# Patient Record
Sex: FEMALE | Race: BLACK OR AFRICAN AMERICAN | Employment: FULL TIME | URBAN - METROPOLITAN AREA
[De-identification: names, ages, dates, MRNs, and addresses within clinical notes are randomized per-mention and may not be internally consistent; named-entity substitution may affect disease eponyms.]

---

## 2017-11-28 ENCOUNTER — HOSPITAL ENCOUNTER (EMERGENCY)
Age: 52
Discharge: HOME | End: 2017-11-28
Attending: EMERGENCY MEDICINE
Payer: SELF-PAY

## 2017-11-28 VITALS
WEIGHT: 211 LBS | OXYGEN SATURATION: 99 % | HEART RATE: 80 BPM | DIASTOLIC BLOOD PRESSURE: 99 MMHG | RESPIRATION RATE: 18 BRPM | TEMPERATURE: 98.7 F | SYSTOLIC BLOOD PRESSURE: 156 MMHG | HEIGHT: 66 IN | BODY MASS INDEX: 33.91 KG/M2

## 2017-11-28 DIAGNOSIS — N30.00 ACUTE CYSTITIS WITHOUT HEMATURIA: Primary | ICD-10-CM

## 2017-11-28 LAB
ALBUMIN SERPL-MCNC: 3.7 G/DL (ref 3.5–5)
ALBUMIN/GLOB SERPL: 0.8 {RATIO} (ref 1.2–3.5)
ALP SERPL-CCNC: 46 U/L (ref 50–136)
ALT SERPL-CCNC: 36 U/L (ref 12–65)
ANION GAP SERPL CALC-SCNC: 9 MMOL/L (ref 7–16)
AST SERPL-CCNC: 31 U/L (ref 15–37)
BACTERIA URNS QL MICRO: NORMAL /HPF
BASOPHILS # BLD: 0 K/UL (ref 0–0.2)
BASOPHILS NFR BLD: 0 % (ref 0–2)
BILIRUB SERPL-MCNC: 0.3 MG/DL (ref 0.2–1.1)
BUN SERPL-MCNC: 8 MG/DL (ref 6–23)
CALCIUM SERPL-MCNC: 8.9 MG/DL (ref 8.3–10.4)
CASTS URNS QL MICRO: NORMAL /LPF
CHLORIDE SERPL-SCNC: 104 MMOL/L (ref 98–107)
CO2 SERPL-SCNC: 27 MMOL/L (ref 21–32)
CREAT SERPL-MCNC: 0.88 MG/DL (ref 0.6–1)
DIFFERENTIAL METHOD BLD: ABNORMAL
EOSINOPHIL # BLD: 0.1 K/UL (ref 0–0.8)
EOSINOPHIL NFR BLD: 1 % (ref 0.5–7.8)
EPI CELLS #/AREA URNS HPF: NORMAL /HPF
ERYTHROCYTE [DISTWIDTH] IN BLOOD BY AUTOMATED COUNT: 15.1 % (ref 11.9–14.6)
GLOBULIN SER CALC-MCNC: 4.4 G/DL (ref 2.3–3.5)
GLUCOSE SERPL-MCNC: 103 MG/DL (ref 65–100)
HCT VFR BLD AUTO: 30.6 % (ref 35.8–46.3)
HGB BLD-MCNC: 10.3 G/DL (ref 11.7–15.4)
HGB RETIC QN AUTO: 28 PG (ref 29–35)
IMM GRANULOCYTES # BLD: 0 K/UL (ref 0–0.5)
IMM GRANULOCYTES NFR BLD AUTO: 0 % (ref 0–5)
IMM RETICS NFR: 15 % (ref 3–15.9)
LYMPHOCYTES # BLD: 2 K/UL (ref 0.5–4.6)
LYMPHOCYTES NFR BLD: 28 % (ref 13–44)
MCH RBC QN AUTO: 24.3 PG (ref 26.1–32.9)
MCHC RBC AUTO-ENTMCNC: 33.7 G/DL (ref 31.4–35)
MCV RBC AUTO: 72.3 FL (ref 79.6–97.8)
MONOCYTES # BLD: 0.8 K/UL (ref 0.1–1.3)
MONOCYTES NFR BLD: 11 % (ref 4–12)
NEUTS SEG # BLD: 4.2 K/UL (ref 1.7–8.2)
NEUTS SEG NFR BLD: 60 % (ref 43–78)
PLATELET # BLD AUTO: 222 K/UL (ref 150–450)
PMV BLD AUTO: 10.2 FL (ref 10.8–14.1)
POTASSIUM SERPL-SCNC: 3.5 MMOL/L (ref 3.5–5.1)
PROT SERPL-MCNC: 8.1 G/DL (ref 6.3–8.2)
RBC # BLD AUTO: 4.23 M/UL (ref 4.05–5.25)
RBC #/AREA URNS HPF: NORMAL /HPF
RETICS # AUTO: 0.05 M/UL (ref 0.02–0.08)
RETICS/RBC NFR AUTO: 1.1 % (ref 0.3–2)
SODIUM SERPL-SCNC: 140 MMOL/L (ref 136–145)
WBC # BLD AUTO: 7.1 K/UL (ref 4.3–11.1)
WBC URNS QL MICRO: NORMAL /HPF

## 2017-11-28 PROCEDURE — 81015 MICROSCOPIC EXAM OF URINE: CPT | Performed by: EMERGENCY MEDICINE

## 2017-11-28 PROCEDURE — 96374 THER/PROPH/DIAG INJ IV PUSH: CPT | Performed by: NURSE PRACTITIONER

## 2017-11-28 PROCEDURE — 74011250636 HC RX REV CODE- 250/636: Performed by: NURSE PRACTITIONER

## 2017-11-28 PROCEDURE — 87086 URINE CULTURE/COLONY COUNT: CPT | Performed by: NURSE PRACTITIONER

## 2017-11-28 PROCEDURE — 80053 COMPREHEN METABOLIC PANEL: CPT | Performed by: NURSE PRACTITIONER

## 2017-11-28 PROCEDURE — 81003 URINALYSIS AUTO W/O SCOPE: CPT | Performed by: NURSE PRACTITIONER

## 2017-11-28 PROCEDURE — 96361 HYDRATE IV INFUSION ADD-ON: CPT | Performed by: NURSE PRACTITIONER

## 2017-11-28 PROCEDURE — 99284 EMERGENCY DEPT VISIT MOD MDM: CPT | Performed by: NURSE PRACTITIONER

## 2017-11-28 PROCEDURE — 85025 COMPLETE CBC W/AUTO DIFF WBC: CPT | Performed by: NURSE PRACTITIONER

## 2017-11-28 PROCEDURE — 85046 RETICYTE/HGB CONCENTRATE: CPT | Performed by: NURSE PRACTITIONER

## 2017-11-28 RX ORDER — SODIUM CHLORIDE 9 MG/ML
500 INJECTION, SOLUTION INTRAVENOUS ONCE
Status: COMPLETED | OUTPATIENT
Start: 2017-11-28 | End: 2017-11-28

## 2017-11-28 RX ORDER — KETOROLAC TROMETHAMINE 30 MG/ML
30 INJECTION, SOLUTION INTRAMUSCULAR; INTRAVENOUS
Status: COMPLETED | OUTPATIENT
Start: 2017-11-28 | End: 2017-11-28

## 2017-11-28 RX ORDER — NITROFURANTOIN 25; 75 MG/1; MG/1
100 CAPSULE ORAL 2 TIMES DAILY
Qty: 14 CAP | Refills: 0 | Status: SHIPPED | OUTPATIENT
Start: 2017-11-28 | End: 2017-12-05

## 2017-11-28 RX ORDER — PHENAZOPYRIDINE HYDROCHLORIDE 200 MG/1
200 TABLET, FILM COATED ORAL 3 TIMES DAILY
Qty: 6 TAB | Refills: 0 | Status: SHIPPED | OUTPATIENT
Start: 2017-11-28 | End: 2017-11-30

## 2017-11-28 RX ADMIN — SODIUM CHLORIDE 500 ML: 900 INJECTION, SOLUTION INTRAVENOUS at 15:59

## 2017-11-28 RX ADMIN — KETOROLAC TROMETHAMINE 30 MG: 30 INJECTION, SOLUTION INTRAMUSCULAR at 16:00

## 2017-11-28 NOTE — LETTER
[facility] EMERGENCY DEPT 
[address] [place] [id number] [phone] Work/School Note Date: 11/28/2017 To Whom It May concern: 
 
Jamie Mccoy was seen and treated today in the emergency room by the following provider(s): 
Attending Provider: Pina Abraham MD 
Nurse Practitioner: COLT Steward. Jamie Mccoy may return to work on 11/30/17. Sincerely, Josey Romero RN

## 2017-11-28 NOTE — ED TRIAGE NOTES
[facility]MD[facility]. Pt c/o back pain since Saturday. Has a hx of sickle cell disease and fibromyalgia. She states that feels like this is more related to fibromyalgia than sickle cell. Per EMS pt received 400 ml of NS  And BGL was 123 in route.

## 2017-11-28 NOTE — ED PROVIDER NOTES
HPI Comments: Patient presents with lower back pain that radiates into bilateral legs and feet. She states pain started on Saturday. She states numbness \"at times\" but denies at this time. She states she is having frequent urination. She states pain is different as to when she has a flair up of sickle cell. She states pain is more consistent with her fibromyalgia pain. Patient is a 50 y.o. female presenting with back pain. The history is provided by the patient. Back Pain    This is a new problem. The current episode started more than 2 days ago. The problem has been gradually worsening. The problem occurs constantly. Patient reports not work related injury. The pain is associated with no known injury. The pain is present in the lumbar spine. The quality of the pain is described as shooting. The pain radiates to the right thigh and left thigh. The pain is at a severity of 8/10. The pain is moderate. Associated symptoms include leg pain. Pertinent negatives include no chest pain, no fever, no numbness, no weight loss, no headaches, no abdominal pain, no abdominal swelling, no bowel incontinence, no perianal numbness, no bladder incontinence, no dysuria, no pelvic pain, no paresthesias, no paresis, no tingling and no weakness. She has tried NSAIDs for the symptoms. The treatment provided no relief. Past Medical History:   Diagnosis Date    Asthma     Diabetes ([address]     Endocrine disease     Hypothyroid    Other ill-defined conditions(799.89)     Fibromyalgia       Past Surgical History:   Procedure Laterality Date    HX GYN      Tubal         History reviewed. No pertinent family history. Social History     Social History    Marital status:      Spouse name: N/A    Number of children: N/A    Years of education: N/A     Occupational History    Not on file. Social History Main Topics    Smoking status: Never Smoker    Smokeless tobacco: Never Used    Alcohol use No    Drug use:  No  Sexual activity: No     Other Topics Concern    Not on file     Social History Narrative         ALLERGIES: Latex; Kiwi; [place] flavor; and Yellow dye    Review of Systems   Constitutional: Negative for fever and weight loss. Respiratory: Negative for cough and shortness of breath. Cardiovascular: Negative for chest pain. Gastrointestinal: Negative for abdominal pain, bowel incontinence, constipation, diarrhea, nausea and vomiting. Genitourinary: Positive for frequency. Negative for bladder incontinence, dysuria and pelvic pain. Musculoskeletal: Positive for back pain and myalgias. Skin: Negative for color change and wound. Neurological: Negative for dizziness, tingling, weakness, numbness, headaches and paresthesias. Vitals:    11/28/17 1434   BP: 149/80   Pulse: 83   Resp: 16   Temp: 98.8 °F (37.1 °C)   SpO2: 99%   Weight: 95.7 kg (211 lb)   Height: 5' 6\" (1.676 m)            Physical Exam   Constitutional: She is oriented to person, place, and time. No distress. Cardiovascular: Normal rate and regular rhythm. No murmur heard. Pulmonary/Chest: Effort normal and breath sounds normal. No respiratory distress. She has no wheezes. Abdominal: Soft. There is no tenderness. Musculoskeletal:        Lumbar back: She exhibits tenderness and pain. Back:    Neurological: She is alert and oriented to person, place, and time. Skin: Skin is warm and dry. She is not diaphoretic. Psychiatric: She has a normal mood and affect. Her behavior is normal.   Nursing note and vitals reviewed.      Recent Results (from the past 12 hour(s))   CBC WITH AUTOMATED DIFF    Collection Time: 11/28/17  3:53 PM   Result Value Ref Range    WBC 7.1 4.3 - 11.1 K/uL    RBC 4.23 4.05 - 5.25 M/uL    HGB 10.3 (L) 11.7 - 15.4 g/dL    HCT 30.6 (L) 35.8 - 46.3 %    MCV 72.3 (L) 79.6 - 97.8 FL    MCH 24.3 (L) 26.1 - 32.9 PG    MCHC 33.7 31.4 - 35.0 g/dL    RDW 15.1 (H) 11.9 - 14.6 %    PLATELET 490 925 - 256 K/uL MPV 10.2 (L) 10.8 - 14.1 FL    DF AUTOMATED      NEUTROPHILS 60 43 - 78 %    LYMPHOCYTES 28 13 - 44 %    MONOCYTES 11 4.0 - 12.0 %    EOSINOPHILS 1 0.5 - 7.8 %    BASOPHILS 0 0.0 - 2.0 %    IMMATURE GRANULOCYTES 0 0.0 - 5.0 %    ABS. NEUTROPHILS 4.2 1.7 - 8.2 K/UL    ABS. LYMPHOCYTES 2.0 0.5 - 4.6 K/UL    ABS. MONOCYTES 0.8 0.1 - 1.3 K/UL    ABS. EOSINOPHILS 0.1 0.0 - 0.8 K/UL    ABS. BASOPHILS 0.0 0.0 - 0.2 K/UL    ABS. IMM. GRANS. 0.0 0.0 - 0.5 K/UL   METABOLIC PANEL, COMPREHENSIVE    Collection Time: 11/28/17  3:53 PM   Result Value Ref Range    Sodium 140 136 - 145 mmol/L    Potassium 3.5 3.5 - 5.1 mmol/L    Chloride 104 98 - 107 mmol/L    CO2 27 21 - 32 mmol/L    Anion gap 9 7 - 16 mmol/L    Glucose 103 (H) 65 - 100 mg/dL    BUN 8 6 - 23 MG/DL    Creatinine 0.88 0.6 - 1.0 MG/DL    GFR est AA >60 >60 ml/min/1.73m2    GFR est non-AA >60 >60 ml/min/1.73m2    Calcium 8.9 8.3 - 10.4 MG/DL    Bilirubin, total 0.3 0.2 - 1.1 MG/DL    ALT (SGPT) 36 12 - 65 U/L    AST (SGOT) 31 15 - 37 U/L    Alk. phosphatase 46 (L) 50 - 136 U/L    Protein, total 8.1 6.3 - 8.2 g/dL    Albumin 3.7 3.5 - 5.0 g/dL    Globulin 4.4 (H) 2.3 - 3.5 g/dL    A-G Ratio 0.8 (L) 1.2 - 3.5     RETICULOCYTE COUNT    Collection Time: 11/28/17  3:53 PM   Result Value Ref Range    Reticulocyte count 1.1 0.3 - 2.0 %    Absolute Retic Cnt. 0.0457 0.0164 - 0.0776 M/ul    Immature Retic Fraction 15.0 3.0 - 15.9 %    Retic Hgb Conc. 28 (L) 29 - 35 pg   URINE MICROSCOPIC    Collection Time: 11/28/17  3:53 PM   Result Value Ref Range    WBC 20-50 0 /hpf    RBC 0-3 0 /hpf    Epithelial cells 0-3 0 /hpf    Bacteria TRACE 0 /hpf    Casts 0-3 0 /lpf       MDM  Number of Diagnoses or Management Options  Acute cystitis without hematuria:   Diagnosis management comments: UA positive for infection. Reticulocyte count no acute findings. No other acute findings noted on lab results. Patient given IV tordaol which she states helped relieve pain.  Patient urine sent for culture and patient given prescription for Macrobid.         Amount and/or Complexity of Data Reviewed  Clinical lab tests: ordered and reviewed  Tests in the medicine section of CPT®: ordered and reviewed    Patient Progress  Patient progress: stable    ED Course       Procedures

## 2017-11-28 NOTE — DISCHARGE INSTRUCTIONS

## 2017-11-28 NOTE — ED NOTES
#20 PIV removed from left AC,  Catheter tip intact. I have reviewed discharge instructions with the patient. The patient verbalized understanding. Patient left ED via Discharge Method: ambulatory to Home with (daughter). Opportunity for questions and clarification provided. Patient given 2 scripts. Pt in no distress. Pt walked out of ER with daughter without difficulty. To continue your aftercare when you leave the hospital, you may receive an automated call from our care team to check in on how you are doing. This is a free service and part of our promise to provide the best care and service to meet your aftercare needs.  If you have questions, or wish to unsubscribe from this service please call [phone]. Thank you for Choosing our [facility] Emergency Department.

## 2017-12-01 LAB
BACTERIA SPEC CULT: NORMAL
SERVICE CMNT-IMP: NORMAL

## 2018-01-09 ENCOUNTER — HOSPITAL ENCOUNTER (EMERGENCY)
Age: 53
Discharge: HOME | End: 2018-01-09
Attending: EMERGENCY MEDICINE
Payer: SELF-PAY

## 2018-01-09 VITALS
SYSTOLIC BLOOD PRESSURE: 132 MMHG | DIASTOLIC BLOOD PRESSURE: 70 MMHG | HEIGHT: 66 IN | TEMPERATURE: 98.4 F | WEIGHT: 212 LBS | HEART RATE: 84 BPM | RESPIRATION RATE: 18 BRPM | BODY MASS INDEX: 34.07 KG/M2 | OXYGEN SATURATION: 97 %

## 2018-01-09 DIAGNOSIS — N30.00 ACUTE CYSTITIS WITHOUT HEMATURIA: Primary | ICD-10-CM

## 2018-01-09 LAB
BACTERIA URNS QL MICRO: ABNORMAL /HPF
CASTS URNS QL MICRO: ABNORMAL /LPF
EPI CELLS #/AREA URNS HPF: ABNORMAL /HPF
HCG UR QL: NEGATIVE
RBC #/AREA URNS HPF: ABNORMAL /HPF
WBC URNS QL MICRO: ABNORMAL /HPF

## 2018-01-09 PROCEDURE — 87086 URINE CULTURE/COLONY COUNT: CPT | Performed by: EMERGENCY MEDICINE

## 2018-01-09 PROCEDURE — 81015 MICROSCOPIC EXAM OF URINE: CPT | Performed by: EMERGENCY MEDICINE

## 2018-01-09 PROCEDURE — 87186 SC STD MICRODIL/AGAR DIL: CPT | Performed by: EMERGENCY MEDICINE

## 2018-01-09 PROCEDURE — 81003 URINALYSIS AUTO W/O SCOPE: CPT | Performed by: EMERGENCY MEDICINE

## 2018-01-09 PROCEDURE — 99284 EMERGENCY DEPT VISIT MOD MDM: CPT | Performed by: EMERGENCY MEDICINE

## 2018-01-09 PROCEDURE — 87088 URINE BACTERIA CULTURE: CPT | Performed by: EMERGENCY MEDICINE

## 2018-01-09 PROCEDURE — 81025 URINE PREGNANCY TEST: CPT

## 2018-01-09 RX ORDER — CEPHALEXIN 500 MG/1
500 CAPSULE ORAL 4 TIMES DAILY
Qty: 28 CAP | Refills: 0 | Status: SHIPPED | OUTPATIENT
Start: 2018-01-09 | End: 2018-01-16

## 2018-01-09 RX ORDER — GLIPIZIDE 10 MG/1
10 TABLET, FILM COATED, EXTENDED RELEASE ORAL DAILY
COMMUNITY
End: 2021-03-18 | Stop reason: CLARIF

## 2018-01-10 NOTE — DISCHARGE INSTRUCTIONS

## 2018-01-10 NOTE — ED NOTES
I have reviewed discharge instructions with the patient. The patient verbalized understanding. Patient left ED via Discharge Method: ambulatory to Home with (self). Opportunity for questions and clarification provided. Patient given 1 scripts. To continue your aftercare when you leave the hospital, you may receive an automated call from our care team to check in on how you are doing. This is a free service and part of our promise to provide the best care and service to meet your aftercare needs.  If you have questions, or wish to unsubscribe from this service please call [phone]. Thank you for Choosing our [facility] Emergency Department.

## 2018-01-10 NOTE — ED TRIAGE NOTES
States that she was recently treated for a UTI.   States that she has noticed her urine being cloudy and foul smelling urine

## 2018-01-10 NOTE — ED PROVIDER NOTES
HPI Comments: Pt with UTI 2 weeks ago. Improved but then returned 2 days ago. Odor and pain present. Patient is a 50 y.o. female presenting with urinary tract infection. The history is provided by the patient. No  was used. Bladder Infection    This is a new problem. The current episode started 2 days ago. The problem occurs every urination. The problem has not changed since onset. The quality of the pain is described as burning. The pain is mild. There has been no fever. Associated symptoms include frequency. Pertinent negatives include no chills, no sweats, no nausea, no vomiting, no discharge, no hematuria, no urgency and no flank pain. She has tried nothing for the symptoms. Past Medical History:   Diagnosis Date    Asthma     Diabetes [address] [address]     Endocrine disease     Hypothyroid    Other ill-defined conditions(799.89)     Fibromyalgia       Past Surgical History:   Procedure Laterality Date    HX GYN      Tubal         History reviewed. No pertinent family history. Social History     Social History    Marital status:      Spouse name: N/A    Number of children: N/A    Years of education: N/A     Occupational History    Not on file. Social History Main Topics    Smoking status: Never Smoker    Smokeless tobacco: Never Used    Alcohol use No    Drug use: No    Sexual activity: No     Other Topics Concern    Not on file     Social History Narrative         ALLERGIES: Latex; Kiwi; [place] flavor; and Yellow dye    Review of Systems   Constitutional: Negative for chills and fever. Eyes: Negative for pain and redness. Respiratory: Negative for chest tightness, shortness of breath and wheezing. Cardiovascular: Negative for chest pain and leg swelling. Gastrointestinal: Negative for abdominal pain, diarrhea, nausea and vomiting. Genitourinary: Positive for dysuria and frequency. Negative for flank pain, hematuria and urgency.    Musculoskeletal: Negative for back pain, gait problem, neck pain and neck stiffness. Skin: Negative for color change and rash. Neurological: Negative for weakness, numbness and headaches. Vitals:    01/09/18 1922   BP: 141/79   Pulse: 87   Resp: 20   Temp: 98.5 °F (36.9 °C)   SpO2: 96%   Weight: 96.2 kg (212 lb)   Height: 5' 6\" (1.676 m)            Physical Exam   Constitutional: She is oriented to person, place, and time. She appears well-developed and well-nourished. HENT:   Head: Normocephalic and atraumatic. Cardiovascular: Normal rate and regular rhythm. No murmur heard. Pulmonary/Chest: Effort normal and breath sounds normal. She has no wheezes. Abdominal: Soft. Bowel sounds are normal. There is no tenderness. Neurological: She is alert and oriented to person, place, and time. Skin: Skin is warm and dry. Nursing note and vitals reviewed. MDM  Number of Diagnoses or Management Options  Diagnosis management comments: uti will treat.         Amount and/or Complexity of Data Reviewed  Clinical lab tests: ordered and reviewed    Patient Progress  Patient progress: stable    ED Course       Procedures           Results Include:    Recent Results (from the past 24 hour(s))   HCG URINE, QL. - POC    Collection Time: 01/09/18  8:04 PM   Result Value Ref Range    Pregnancy test,urine (POC) NEGATIVE  NEG     URINE MICROSCOPIC    Collection Time: 01/09/18  8:08 PM   Result Value Ref Range    WBC  0 /hpf    RBC 0-3 0 /hpf    Epithelial cells 0-3 0 /hpf    Bacteria 4+ (H) 0 /hpf    Casts 3-5 0 /lpf

## 2018-01-13 LAB
BACTERIA SPEC CULT: ABNORMAL
SERVICE CMNT-IMP: ABNORMAL

## 2018-10-13 ENCOUNTER — HOSPITAL ENCOUNTER (EMERGENCY)
Age: 53
Discharge: HOME | End: 2018-10-14
Attending: EMERGENCY MEDICINE
Payer: SELF-PAY

## 2018-10-13 DIAGNOSIS — E11.65 TYPE 2 DIABETES MELLITUS WITH HYPERGLYCEMIA, WITHOUT LONG-TERM CURRENT USE OF INSULIN (HCC): ICD-10-CM

## 2018-10-13 DIAGNOSIS — G43.801 OTHER MIGRAINE WITH STATUS MIGRAINOSUS, NOT INTRACTABLE: Primary | ICD-10-CM

## 2018-10-13 LAB
ALBUMIN SERPL-MCNC: 3.4 G/DL (ref 3.5–5)
ALBUMIN/GLOB SERPL: 0.7 {RATIO}
ALP SERPL-CCNC: 68 U/L (ref 50–130)
ALT SERPL-CCNC: 28 U/L (ref 12–65)
ANION GAP SERPL CALC-SCNC: 8 MMOL/L
AST SERPL-CCNC: 12 U/L (ref 15–37)
BACTERIA URNS QL MICRO: 0 /HPF
BASOPHILS # BLD: 0 K/UL (ref 0–0.2)
BASOPHILS NFR BLD: 0 % (ref 0–2)
BILIRUB SERPL-MCNC: 0.2 MG/DL (ref 0.2–1.1)
BUN SERPL-MCNC: 9 MG/DL (ref 6–23)
CALCIUM SERPL-MCNC: 8.7 MG/DL (ref 8.3–10.4)
CASTS URNS QL MICRO: 0 /LPF
CHLORIDE SERPL-SCNC: 101 MMOL/L (ref 98–107)
CO2 SERPL-SCNC: 28 MMOL/L (ref 21–32)
CREAT SERPL-MCNC: 1.06 MG/DL (ref 0.6–1)
DIFFERENTIAL METHOD BLD: ABNORMAL
EOSINOPHIL # BLD: 0.2 K/UL (ref 0–0.8)
EOSINOPHIL NFR BLD: 2 % (ref 0.5–7.8)
EPI CELLS #/AREA URNS HPF: NORMAL /HPF
ERYTHROCYTE [DISTWIDTH] IN BLOOD BY AUTOMATED COUNT: 13.8 %
GLOBULIN SER CALC-MCNC: 4.6 G/DL (ref 2.3–3.5)
GLUCOSE BLD STRIP.AUTO-MCNC: 225 MG/DL (ref 65–100)
GLUCOSE BLD STRIP.AUTO-MCNC: 359 MG/DL (ref 65–100)
GLUCOSE SERPL-MCNC: 233 MG/DL (ref 65–100)
HCG UR QL: NEGATIVE
HCT VFR BLD AUTO: 32.2 % (ref 35.8–46.3)
HGB BLD-MCNC: 10.2 G/DL (ref 11.7–15.4)
IMM GRANULOCYTES # BLD: 0 K/UL (ref 0–0.5)
IMM GRANULOCYTES NFR BLD AUTO: 0 % (ref 0–5)
LYMPHOCYTES # BLD: 2.4 K/UL (ref 0.5–4.6)
LYMPHOCYTES NFR BLD: 37 % (ref 13–44)
MCH RBC QN AUTO: 23.3 PG (ref 26.1–32.9)
MCHC RBC AUTO-ENTMCNC: 31.7 G/DL (ref 31.4–35)
MCV RBC AUTO: 73.7 FL (ref 79.6–97.8)
MONOCYTES # BLD: 0.7 K/UL (ref 0.1–1.3)
MONOCYTES NFR BLD: 10 % (ref 4–12)
NEUTS SEG # BLD: 3.2 K/UL (ref 1.7–8.2)
NEUTS SEG NFR BLD: 50 % (ref 43–78)
NRBC # BLD: 0 K/UL (ref 0–0.2)
PLATELET # BLD AUTO: 306 K/UL (ref 150–450)
PMV BLD AUTO: 10.3 FL (ref 9.4–12.3)
POTASSIUM SERPL-SCNC: 3.2 MMOL/L (ref 3.5–5.1)
PROT SERPL-MCNC: 8 G/DL
RBC # BLD AUTO: 4.37 M/UL (ref 4.05–5.2)
RBC #/AREA URNS HPF: NORMAL /HPF
SODIUM SERPL-SCNC: 137 MMOL/L (ref 136–145)
WBC # BLD AUTO: 6.4 K/UL (ref 4.3–11.1)
WBC URNS QL MICRO: NORMAL /HPF

## 2018-10-13 PROCEDURE — 96361 HYDRATE IV INFUSION ADD-ON: CPT | Performed by: EMERGENCY MEDICINE

## 2018-10-13 PROCEDURE — 99284 EMERGENCY DEPT VISIT MOD MDM: CPT | Performed by: EMERGENCY MEDICINE

## 2018-10-13 PROCEDURE — 74011250636 HC RX REV CODE- 250/636: Performed by: EMERGENCY MEDICINE

## 2018-10-13 PROCEDURE — 81025 URINE PREGNANCY TEST: CPT

## 2018-10-13 PROCEDURE — 81003 URINALYSIS AUTO W/O SCOPE: CPT | Performed by: EMERGENCY MEDICINE

## 2018-10-13 PROCEDURE — 85025 COMPLETE CBC W/AUTO DIFF WBC: CPT

## 2018-10-13 PROCEDURE — 74011636637 HC RX REV CODE- 636/637: Performed by: EMERGENCY MEDICINE

## 2018-10-13 PROCEDURE — 82962 GLUCOSE BLOOD TEST: CPT

## 2018-10-13 PROCEDURE — 81015 MICROSCOPIC EXAM OF URINE: CPT

## 2018-10-13 PROCEDURE — 96375 TX/PRO/DX INJ NEW DRUG ADDON: CPT | Performed by: EMERGENCY MEDICINE

## 2018-10-13 PROCEDURE — 96374 THER/PROPH/DIAG INJ IV PUSH: CPT | Performed by: EMERGENCY MEDICINE

## 2018-10-13 PROCEDURE — 80053 COMPREHEN METABOLIC PANEL: CPT

## 2018-10-13 RX ORDER — METOCLOPRAMIDE HYDROCHLORIDE 5 MG/ML
10 INJECTION INTRAMUSCULAR; INTRAVENOUS
Status: COMPLETED | OUTPATIENT
Start: 2018-10-13 | End: 2018-10-13

## 2018-10-13 RX ORDER — KETOROLAC TROMETHAMINE 30 MG/ML
30 INJECTION, SOLUTION INTRAMUSCULAR; INTRAVENOUS
Status: COMPLETED | OUTPATIENT
Start: 2018-10-13 | End: 2018-10-13

## 2018-10-13 RX ADMIN — INSULIN HUMAN 6 UNITS: 100 INJECTION, SOLUTION PARENTERAL at 22:34

## 2018-10-13 RX ADMIN — SODIUM CHLORIDE 1000 ML: 900 INJECTION, SOLUTION INTRAVENOUS at 22:03

## 2018-10-13 RX ADMIN — METOCLOPRAMIDE 10 MG: 5 INJECTION, SOLUTION INTRAMUSCULAR; INTRAVENOUS at 22:34

## 2018-10-13 RX ADMIN — KETOROLAC TROMETHAMINE 30 MG: 30 INJECTION, SOLUTION INTRAMUSCULAR at 22:34

## 2018-10-14 VITALS
HEART RATE: 84 BPM | BODY MASS INDEX: 35.36 KG/M2 | RESPIRATION RATE: 18 BRPM | TEMPERATURE: 98 F | WEIGHT: 220 LBS | DIASTOLIC BLOOD PRESSURE: 77 MMHG | HEIGHT: 66 IN | SYSTOLIC BLOOD PRESSURE: 160 MMHG | OXYGEN SATURATION: 99 %

## 2018-10-14 NOTE — ED PROVIDER NOTES
HPI Comments: Patient complains of throbbing left-sided headache since yesterday. She has also been playing more frequently and blood sugar has been elevated. She has a history of type 2 diabetes. Is not been able to tolerate her metformin and only takes half of a dose. She is also on glipizide. Patient had some sliders from a fast food restaurant and sweet tea today. She denies any runny nose congestion cough or fever. No neck pain or neck stiffness. No numbness tingling or weakness on the left or right side. Previous history of migraine headaches when she was younger which were much more severe but similar. Patient is a 48 y.o. female presenting with headaches. The history is provided by the patient. Headache This is a new problem. The current episode started yesterday. The problem occurs constantly. The problem has not changed since onset. The headache is aggravated by bright light and nausea. The pain is located in the left unilateral region. The quality of the pain is described as throbbing. Associated symptoms include nausea. Pertinent negatives include no fever, no syncope, no shortness of breath, no visual change and no vomiting. She has tried nothing for the symptoms. Past Medical History:  
Diagnosis Date  Asthma  Diabetes ([address]  Endocrine disease Hypothyroid  Other ill-defined conditions(799.89) Fibromyalgia Past Surgical History:  
Procedure Laterality Date  HX GYN Tubal  
 
   
History reviewed. No pertinent family history. Social History Social History  Marital status:  Spouse name: N/A  
 Number of children: N/A  
 Years of education: N/A Occupational History  Not on file. Social History Main Topics  Smoking status: Never Smoker  Smokeless tobacco: Never Used  Alcohol use No  
 Drug use: No  
 Sexual activity: No  
 
Other Topics Concern  Not on file Social History Narrative ALLERGIES: Latex; Kiwi; [place] flavor; and Yellow dye Review of Systems Constitutional: Negative for fever. HENT: Negative for congestion and rhinorrhea. Respiratory: Negative for shortness of breath. Cardiovascular: Negative for syncope. Gastrointestinal: Positive for nausea. Negative for abdominal pain, diarrhea and vomiting. Endocrine: Positive for polydipsia and polyuria. Genitourinary: Positive for frequency. Negative for dysuria. Musculoskeletal: Negative for back pain and neck pain. Neurological: Positive for headaches. Vitals:  
 10/13/18 2147 10/13/18 2151 BP: 148/86 Pulse: 84 Resp: 18 Temp: 98 °F (36.7 °C) SpO2: 98% Weight:  99.8 kg (220 lb) Height:  5' 6\" (1.676 m) Physical Exam  
Constitutional: She is oriented to person, place, and time. She appears well-developed and well-nourished. Eyes: Conjunctivae are normal. Pupils are equal, round, and reactive to light. Neck: Normal range of motion. Neck supple. Cardiovascular: Normal rate, regular rhythm and normal heart sounds. No murmur heard. Pulmonary/Chest: Breath sounds normal. No respiratory distress. Abdominal: Soft. She exhibits no distension. There is no tenderness. There is no rebound and no guarding. Musculoskeletal: Normal range of motion. She exhibits no edema or tenderness. Neurological: She is alert and oriented to person, place, and time. She has normal strength. No cranial nerve deficit or sensory deficit. She exhibits normal muscle tone. Coordination normal. GCS eye subscore is 4. GCS verbal subscore is 5. GCS motor subscore is 6. Skin: Skin is warm and dry. MDM Number of Diagnoses or Management Options Diagnosis management comments: Blood sugar elevated. Medicine and dietary noncompliance. Headache is not thunderclap or worst of her life. Headache consistent with migraine type headache. Treat blood sugar. Check for UTI. Medicate for migraine headache. Amount and/or Complexity of Data Reviewed Clinical lab tests: ordered and reviewed (Results for orders placed or performed during the hospital encounter of 10/13/18 
-CBC WITH AUTOMATED DIFF Result                                            Value                         Ref Range WBC                                               6.4                           4.3 - 11.1 K/uL           
     RBC                                               4.37                          4.05 - 5.2 M/uL HGB                                               10.2 (L)                      11.7 - 15.4 g/dL HCT                                               32.2 (L)                      35.8 - 46.3 % MCV                                               73.7 (L)                      79.6 - 97.8 FL            
     MCH                                               23.3 (L)                      26.1 - 32.9 PG            
     MCHC                                              31.7                          31.4 - 35.0 g/dL RDW                                               13.8                          % PLATELET                                          306                           150 - 450 K/uL MPV                                               10.3                          9.4 - 12.3 FL ABSOLUTE NRBC                                     0.00                          0.0 - 0.2 K/uL            
     DF                                                AUTOMATED NEUTROPHILS                                       50                            43 - 78 % LYMPHOCYTES                                       37                            13 - 44 % MONOCYTES                                         10                            4.0 - 12.0 % EOSINOPHILS                                       2                             0.5 - 7.8 % BASOPHILS                                         0                             0.0 - 2.0 % IMMATURE GRANULOCYTES                             0                             0.0 - 5.0 %               
     ABS. NEUTROPHILS                                  3.2                           1.7 - 8.2 K/UL            
     ABS. LYMPHOCYTES                                  2.4                           0.5 - 4.6 K/UL            
     ABS. MONOCYTES                                    0.7                           0.1 - 1.3 K/UL            
     ABS. EOSINOPHILS                                  0.2                           0.0 - 0.8 K/UL            
     ABS. BASOPHILS                                    0.0                           0.0 - 0.2 K/UL            
     ABS. IMM. GRANS.                                  0.0                           0.0 - 0.5 K/UL            
-METABOLIC PANEL, COMPREHENSIVE Result                                            Value                         Ref Range Sodium                                            137                           136 - 145 mmol/L Potassium                                         3.2 (L)                       3.5 - 5.1 mmol/L Chloride                                          101                           98 - 107 mmol/L           
     CO2                                               28                            21 - 32 mmol/L Anion gap                                         8                             mmol/L Glucose                                           233 (H)                       65 - 100 mg/dL BUN                                               9                             6 - 23 MG/DL Creatinine                                        1.06 (H)                      0.6 - 1.0 MG/DL           
     GFR est AA                                        >60                           >60 ml/min/1.73m2 GFR est non-AA                                    58                            ml/min/1.73m2 Calcium                                           8.7                           8.3 - 10.4 MG/DL Bilirubin, total                                  0.2                           0.2 - 1.1 MG/DL           
     ALT (SGPT)                                        28                            12 - 65 U/L               
     AST (SGOT)                                        12 (L)                        15 - 37 U/L Alk. phosphatase                                  68                            50 - 130 U/L Protein, total                                    8.0                           g/dL Albumin                                           3.4 (L)                       3.5 - 5.0 g/dL Globulin                                          4.6 (H)                       2.3 - 3.5 g/dL A-G Ratio                                         0.7 -URINE MICROSCOPIC Result                                            Value                         Ref Range WBC                                               5-10                          0 /hpf                    
     RBC                                               0-3                           0 /hpf      Epithelial cells                                  0-3                           0 /hpf                    
 Bacteria                                          0                             0 /hpf Casts                                             0                             0 /lpf -GLUCOSE, POC Result                                            Value                         Ref Range Glucose (POC)                                     359 (H)                       65 - 100 mg/dL            
-HCG URINE, QL. - POC Result                                            Value                         Ref Range Pregnancy test,urine (POC)                        NEGATIVE                      NEG                       
-GLUCOSE, POC Result                                            Value                         Ref Range Glucose (POC)                                     225 (H)                       65 - 100 mg/dL            
) 
 
 
 
 
ED Course Procedures

## 2018-10-14 NOTE — ED NOTES
I have reviewed discharge instructions with the patient. The patient verbalized understanding. Patient left ED via Discharge Method: ambulatory to Home with (insert name of family/friend, self, transport POV). Opportunity for questions and clarification provided. Patient given 0 scripts. To continue your aftercare when you leave the hospital, you may receive an automated call from our care team to check in on how you are doing. This is a free service and part of our promise to provide the best care and service to meet your aftercare needs.  If you have questions, or wish to unsubscribe from this service please call [phone]. Thank you for Choosing our [facility] Emergency Department.

## 2018-10-14 NOTE — DISCHARGE INSTRUCTIONS
Migraine Headache: Care Instructions  Your Care Instructions  Migraines are painful, throbbing headaches that often start on one side of the head. They may cause nausea and vomiting and make you sensitive to light, sound, or smell. Without treatment, migraines can last from 4 hours to a few days. Medicines can help prevent migraines or stop them after they have started. Your doctor can help you find which ones work best for you. Follow-up care is a key part of your treatment and safety. Be sure to make and go to all appointments, and call your doctor if you are having problems. It's also a good idea to know your test results and keep a list of the medicines you take. How can you care for yourself at home? · Do not drive if you have taken a prescription pain medicine. · Rest in a quiet, dark room until your headache is gone. Close your eyes, and try to relax or go to sleep. Don't watch TV or read. · Put a cold, moist cloth or cold pack on the painful area for 10 to 20 minutes at a time. Put a thin cloth between the cold pack and your skin. · Use a warm, moist towel or a heating pad set on low to relax tight shoulder and neck muscles. · Have someone gently massage your neck and shoulders. · Take your medicines exactly as prescribed. Call your doctor if you think you are having a problem with your medicine. You will get more details on the specific medicines your doctor prescribes. · Be careful not to take pain medicine more often than the instructions allow. You could get worse or more frequent headaches when the medicine wears off. To prevent migraines  · Keep a headache diary so you can figure out what triggers your headaches. Avoiding triggers may help you prevent headaches. Record when each headache began, how long it lasted, and what the pain was like.  (Was it throbbing, aching, stabbing, or dull?) Write down any other symptoms you had with the headache, such as nausea, flashing lights or dark spots, or sensitivity to bright light or loud noise. Note if the headache occurred near your period. List anything that might have triggered the headache. Triggers may include certain foods (chocolate, cheese, wine) or odors, smoke, bright light, stress, or lack of sleep. · If your doctor has prescribed medicine for your migraines, take it as directed. You may have medicine that you take only when you get a migraine and medicine that you take all the time to help prevent migraines. ¨ If your doctor has prescribed medicine for when you get a headache, take it at the first sign of a migraine, unless your doctor has given you other instructions. ¨ If your doctor has prescribed medicine to prevent migraines, take it exactly as prescribed. Call your doctor if you think you are having a problem with your medicine. · Find healthy ways to deal with stress. Migraines are most common during or right after stressful times. Take time to relax before and after you do something that has caused a migraine in the past.  · Try to keep your muscles relaxed by keeping good posture. Check your jaw, face, neck, and shoulder muscles for tension. Try to relax them. When you sit at a desk, change positions often. And make sure to stretch for 30 seconds each hour. · Get plenty of sleep and exercise. · Eat meals on a regular schedule. Avoid foods and drinks that often trigger migraines. These include chocolate, alcohol (especially red wine and port), aspartame, monosodium glutamate (MSG), and some additives found in foods (such as hot dogs, ring, cold cuts, aged cheeses, and pickled foods). · Limit caffeine. Don't drink too much coffee, tea, or soda. But don't quit caffeine suddenly. That can also give you migraines. · Do not smoke or allow others to smoke around you. If you need help quitting, talk to your doctor about stop-smoking programs and medicines. These can increase your chances of quitting for good.   · If you are taking birth control pills or hormone therapy, talk to your doctor about whether they are triggering your migraines. When should you call for help? Call 911 anytime you think you may need emergency care. For example, call if:    · You have signs of a stroke. These may include:  ¨ Sudden numbness, paralysis, or weakness in your face, arm, or leg, especially on only one side of your body. ¨ Sudden vision changes. ¨ Sudden trouble speaking. ¨ Sudden confusion or trouble understanding simple statements. ¨ Sudden problems with walking or balance. ¨ A sudden, severe headache that is different from past headaches.    Call your doctor now or seek immediate medical care if:    · You have new or worse nausea and vomiting.     · You have a new or higher fever.     · Your headache gets much worse.    Watch closely for changes in your health, and be sure to contact your doctor if:    · You are not getting better after 2 days (48 hours). Where can you learn more? Go to http://rita-luis armando.info/. Enter V311 in the search box to learn more about \"Migraine Headache: Care Instructions. \"  Current as of: June 4, 2018  Content Version: 11.8  © 3267-4779 [employer]. Care instructions adapted under license by [employer] (which disclaims liability or warranty for this information). If you have questions about a medical condition or this instruction, always ask your healthcare professional. [address] any warranty or liability for your use of this information.

## 2019-08-03 ENCOUNTER — APPOINTMENT (OUTPATIENT)
Dept: GENERAL RADIOLOGY | Age: 54
End: 2019-08-03
Attending: PHYSICIAN ASSISTANT
Payer: SELF-PAY

## 2019-08-03 ENCOUNTER — HOSPITAL ENCOUNTER (EMERGENCY)
Age: 54
Discharge: HOME | End: 2019-08-03
Attending: EMERGENCY MEDICINE
Payer: SELF-PAY

## 2019-08-03 VITALS
TEMPERATURE: 98.8 F | DIASTOLIC BLOOD PRESSURE: 89 MMHG | HEIGHT: 66 IN | SYSTOLIC BLOOD PRESSURE: 150 MMHG | OXYGEN SATURATION: 100 % | HEART RATE: 90 BPM | BODY MASS INDEX: 32.14 KG/M2 | WEIGHT: 200 LBS | RESPIRATION RATE: 16 BRPM
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
## 2019-08-03 PROCEDURE — 99283 EMERGENCY DEPT VISIT LOW MDM: CPT | Performed by: PHYSICIAN ASSISTANT

## 2019-08-03 PROCEDURE — 72040 X-RAY EXAM NECK SPINE 2-3 VW: CPT

## 2019-08-03 PROCEDURE — 73030 X-RAY EXAM OF SHOULDER: CPT

## 2019-08-03 RX ORDER — METHOCARBAMOL 750 MG/1
750 TABLET, FILM COATED ORAL 3 TIMES DAILY
Qty: 30 TAB | Refills: 0 | Status: SHIPPED | OUTPATIENT
Start: 2019-08-03 | End: 2019-08-13

## 2019-08-03 RX ORDER — NAPROXEN 500 MG/1
500 TABLET ORAL 2 TIMES DAILY WITH MEALS
Qty: 20 TAB | Refills: 0 | Status: SHIPPED | OUTPATIENT
Start: 2019-08-03 | End: 2019-08-13

## 2019-08-03 NOTE — LETTER
[address] [address] EMERGENCY DEPT 
[address] [facility] 
[place] [id number] [phone] Work/School Note Date: 8/3/2019 To Whom It May concern: 
 
Emmy Negrete was seen and treated today in the emergency room by the following provider(s): 
Attending Provider: Rowan Bui MD 
Physician Assistant: LILLY Arceo. Emmy Negrete may return to work on 8-5-19. Sincerely, LILLY Lynch
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
## 2019-08-03 NOTE — ED NOTES
I have reviewed discharge instructions with the patient. The patient verbalized understanding. Patient left ED via Discharge Method: ambulatory to Home with self. Opportunity for questions and clarification provided. Patient given 2 scripts. To continue your aftercare when you leave the hospital, you may receive an automated call from our care team to check in on how you are doing. This is a free service and part of our promise to provide the best care and service to meet your aftercare needs.  If you have questions, or wish to unsubscribe from this service please call [phone]. Thank you for Choosing our [facility] Emergency Department.

## 2019-09-12 ENCOUNTER — HOSPITAL ENCOUNTER (EMERGENCY)
Age: 54
Discharge: HOME | End: 2019-09-12
Attending: EMERGENCY MEDICINE
Payer: SELF-PAY

## 2019-09-12 VITALS
HEART RATE: 88 BPM | TEMPERATURE: 98.7 F | SYSTOLIC BLOOD PRESSURE: 159 MMHG | WEIGHT: 221 LBS | RESPIRATION RATE: 16 BRPM | BODY MASS INDEX: 35.52 KG/M2 | OXYGEN SATURATION: 99 % | HEIGHT: 66 IN | DIASTOLIC BLOOD PRESSURE: 81 MMHG

## 2019-09-12 DIAGNOSIS — N39.0 URINARY TRACT INFECTION WITHOUT HEMATURIA, SITE UNSPECIFIED: Primary | ICD-10-CM

## 2019-09-12 PROCEDURE — 74011250637 HC RX REV CODE- 250/637: Performed by: EMERGENCY MEDICINE

## 2019-09-12 PROCEDURE — 99283 EMERGENCY DEPT VISIT LOW MDM: CPT | Performed by: EMERGENCY MEDICINE

## 2019-09-12 RX ORDER — CEPHALEXIN 500 MG/1
500 CAPSULE ORAL
Status: COMPLETED | OUTPATIENT
Start: 2019-09-12 | End: 2019-09-12

## 2019-09-12 RX ORDER — CEPHALEXIN 500 MG/1
500 CAPSULE ORAL 4 TIMES DAILY
Qty: 28 CAP | Refills: 0 | Status: SHIPPED | OUTPATIENT
Start: 2019-09-12 | End: 2019-09-19

## 2019-09-12 RX ADMIN — CEPHALEXIN 500 MG: 500 CAPSULE ORAL at 19:35

## 2019-09-12 NOTE — ED TRIAGE NOTES
Pt is type 2 diabetic and has been having pain with urination that started a few days ago with cloudy urine. Today is when she started having pain and malodorous urine.

## 2019-09-12 NOTE — ED NOTES
I have reviewed discharge instructions with the patient. The patient verbalized understanding. Patient left ED via Discharge Method: ambulatory to Home with  self). Opportunity for questions and clarification provided. Patient given 1 scripts. To continue your aftercare when you leave the hospital, you may receive an automated call from our care team to check in on how you are doing. This is a free service and part of our promise to provide the best care and service to meet your aftercare needs.  If you have questions, or wish to unsubscribe from this service please call [phone]. Thank you for Choosing our [facility] Emergency Department.

## 2019-09-12 NOTE — DISCHARGE INSTRUCTIONS
Drink plenty of fluid. Complete course of antibiotic. Take probiotics, eat bananas to decrease risk of diarrhea while on antibiotic.   Return if worsening symptoms

## 2019-09-12 NOTE — ED PROVIDER NOTES
HPI:  48 female here with urinary pain and odor. History of diabetes. Started having some cloudiness in the urine 5 6 days ago. Started having some discomfort over the past 2 to 3 days. Significant odor noted today. No fever. Has had UTI before. This feels similar. Denies any abdominal pain nausea vomiting or diarrhea. No vaginal bleeding. Prior tubals ligation. Not concerned about STD or pregnancy. ROS  Constitutional: No fever, no chills  Skin: no rash  Eye: No vision changes  ENMT: No sore throat  Respiratory: No shortness of breath, no cough  Cardiovascular: No chest pain, no palpitations  Gastrointestinal: No vomiting, no nausea, no diarrhea, no abdominal pain  : + dysuria  MSK: No back pain, no muscle pain, no joint pain  Neuro: No headache, no change in mental status, no numbness, no tingling, no weakness  Psych:   Endocrine:   All other review of systems positive per history of present illness and the above otherwise negative or noncontributory. Visit Vitals  Pulse 81   Temp 99 °F (37.2 °C)   Resp 16   Ht 5' 6\" (1.676 m)   Wt 100.2 kg (221 lb)   SpO2 100%   BMI 35.67 kg/m²     Past Medical History:   Diagnosis Date    Asthma     Diabetes ([facility] [address]     Endocrine disease     Hypothyroid    Other ill-defined conditions(799.89)     Fibromyalgia     Past Surgical History:   Procedure Laterality Date    HX GYN      Tubal     Prior to Admission Medications   Prescriptions Last Dose Informant Patient Reported? Taking? cetirizine (ZYRTEC) 10 mg tablet   Yes No   Sig: Take  by mouth daily. cholecalciferol, vitamin D3, (VITAMIN D3) 2,000 unit Tab   Yes No   Sig: Take  by mouth daily. ferrous sulfate (IRON) 325 mg (65 mg iron) tablet   Yes No   Sig: Take  by mouth Daily (before breakfast). glipiZIDE SR (GLUCOTROL XL) 10 mg CR tablet   Yes No   Sig: Take 10 mg by mouth daily. levothyroxine (SYNTHROID) 150 mcg tablet   Yes No   Sig: Take 150 mcg by mouth Daily (before breakfast). metFORMIN (GLUCOPHAGE) 500 mg tablet   Yes No   Sig: Take 1,000 mg by mouth two (2) times daily (with meals). pregabalin (LYRICA) 75 mg capsule   Yes No   Sig: Take 75 mg by mouth two (2) times a day. Facility-Administered Medications: None         Adult Exam   General: alert, no acute distress  Head: normocephalic  ENT: moist mucous membranes  Neck: supple, non-tender; full range of motion  Cardiovascular: regular rate and rhythm, normal peripheral perfusion  Respiratory:  normal respirations  Gastrointestinal: soft, non-tender; no rebound or guarding, no peritoneal signs, no distension. No CVA tenderness  Back: non-tender, full range of motion  Musculoskeletal: normal range of motion, normal strength, no gross deformities  Neurological: alert and oriented x 4, no gross focal deficits; normal speech  Psychiatric: cooperative; appropriate mood and affect    MDM:  Vital signs stable she is nontoxic and well-appearing. No CVA tenderness. Abdomen is soft. Urine at bedside extremely cloudy with strong odor. Suspect UTI. Will treat with Keflex  5 mg 4 times daily x7. Low suspicion for bacteremia. Do not feel any labs or further imaging is necessary. Recommend follow-up with primary care physician. Return precautions given. No further questions or concerns. Dragon voice recognition software was used to create this note. Although the note has been reviewed and corrected where necessary, additional errors may have been overlooked and remain in the text.

## 2021-03-18 ENCOUNTER — HOSPITAL ENCOUNTER (EMERGENCY)
Age: 56
Discharge: HOME | End: 2021-03-18
Attending: EMERGENCY MEDICINE

## 2021-03-18 VITALS
RESPIRATION RATE: 16 BRPM | WEIGHT: 217 LBS | DIASTOLIC BLOOD PRESSURE: 87 MMHG | OXYGEN SATURATION: 98 % | HEIGHT: 66 IN | BODY MASS INDEX: 34.87 KG/M2 | TEMPERATURE: 98.6 F | SYSTOLIC BLOOD PRESSURE: 137 MMHG | HEART RATE: 87 BPM

## 2021-03-18 DIAGNOSIS — N30.00 ACUTE CYSTITIS WITHOUT HEMATURIA: Primary | ICD-10-CM

## 2021-03-18 LAB
BACTERIA URNS QL MICRO: ABNORMAL /HPF
CASTS URNS QL MICRO: ABNORMAL /LPF
EPI CELLS #/AREA URNS HPF: ABNORMAL /HPF
RBC #/AREA URNS HPF: ABNORMAL /HPF
WBC URNS QL MICRO: ABNORMAL /HPF

## 2021-03-18 PROCEDURE — 99283 EMERGENCY DEPT VISIT LOW MDM: CPT

## 2021-03-18 PROCEDURE — 81003 URINALYSIS AUTO W/O SCOPE: CPT

## 2021-03-18 PROCEDURE — 81015 MICROSCOPIC EXAM OF URINE: CPT

## 2021-03-18 RX ORDER — CEPHALEXIN 500 MG/1
500 CAPSULE ORAL 4 TIMES DAILY
Qty: 28 CAP | Refills: 0 | Status: SHIPPED | OUTPATIENT
Start: 2021-03-18 | End: 2021-03-25

## 2021-03-18 RX ORDER — LOSARTAN POTASSIUM 25 MG/1
25 TABLET ORAL DAILY
COMMUNITY

## 2021-03-18 NOTE — ED PROVIDER NOTES
Patient is a 50year old female who presents to the emergency room with CC of foul smelling urine x 3 weeks. She does associate stephanie discomfort with urination that has been intermittently present. She has used some AZO at home with improvement, states she also took an AZO home uti test however she did not understand how to interpret. Denies nvd, fever, chills, flank pain, vaginal bleeding, vaginal discharge, hematuria. The history is provided by the patient. Urinary Pain   This is a new problem. The current episode started more than 1 week ago. The problem occurs every urination. The problem has not changed since onset. There has been no fever. Pertinent negatives include no chills, no sweats, no nausea, no vomiting, no discharge, no flank pain, no vaginal discharge and no abdominal pain. She has tried nothing for the symptoms. Her past medical history is significant for recurrent UTIs. Her past medical history does not include kidney stones. Past Medical History:   Diagnosis Date    Asthma     Diabetes ([facility] [address]     Endocrine disease     Hypothyroid    Other ill-defined conditions(799.89)     Fibromyalgia    UTI (urinary tract infection)        Past Surgical History:   Procedure Laterality Date    HX GYN      Tubal         History reviewed. No pertinent family history.     Social History     Socioeconomic History    Marital status:      Spouse name: Not on file    Number of children: Not on file    Years of education: Not on file    Highest education level: Not on file   Occupational History    Not on file   Social Needs    Financial resource strain: Not on file    Food insecurity     Worry: Not on file     Inability: Not on file    Transportation needs     Medical: Not on file     Non-medical: Not on file   Tobacco Use    Smoking status: Never Smoker    Smokeless tobacco: Never Used   Substance and Sexual Activity    Alcohol use: No    Drug use: No    Sexual activity: Never Birth control/protection: Surgical   Lifestyle    Physical activity     Days per week: Not on file     Minutes per session: Not on file    Stress: Not on file   Relationships    Social connections     Talks on phone: Not on file     Gets together: Not on file     Attends Jainism service: Not on file     Active member of club or organization: Not on file     Attends meetings of clubs or organizations: Not on file     Relationship status: Not on file    Intimate partner violence     Fear of current or ex partner: Not on file     Emotionally abused: Not on file     Physically abused: Not on file     Forced sexual activity: Not on file   Other Topics Concern    Not on file   Social History Narrative    Not on file         ALLERGIES: Latex, Shellfish derived, Kiwi, [place] flavor, and Yellow dye    Review of Systems   Constitutional: Negative for chills and fever. HENT: Negative for facial swelling. Respiratory: Negative for chest tightness and shortness of breath. Cardiovascular: Negative for chest pain. Gastrointestinal: Negative for abdominal pain, nausea and vomiting. Genitourinary: Negative for flank pain and vaginal discharge. Musculoskeletal: Negative for myalgias. Neurological: Negative for headaches. Psychiatric/Behavioral: Negative for confusion. Vitals:    03/18/21 1901   BP: 137/87   Pulse: 87   Resp: 16   Temp: 98.6 °F (37 °C)   SpO2: 98%   Weight: 98.4 kg (217 lb)   Height: 5' 6\" (1.676 m)            Physical Exam  Vitals signs and nursing note reviewed. Constitutional:       Appearance: Normal appearance. HENT:      Head: Normocephalic and atraumatic. Mouth/Throat:      Mouth: Mucous membranes are moist.   Eyes:      Pupils: Pupils are equal, round, and reactive to light. Cardiovascular:      Rate and Rhythm: Normal rate and regular rhythm. Pulmonary:      Effort: No respiratory distress. Breath sounds: Normal breath sounds.    Abdominal:      Palpations: Abdomen is soft. Tenderness: There is no abdominal tenderness. There is no guarding. Skin:     General: Skin is warm and dry. Neurological:      Mental Status: She is alert and oriented to person, place, and time. Mental status is at baseline. Psychiatric:         Mood and Affect: Mood normal.          MDM  Number of Diagnoses or Management Options  Acute cystitis without hematuria  Diagnosis management comments: 7:22 PM waiting on urinalysis result. On exam patient's belly is soft and non tender, no guarding, she is without flank pain, she is afebrile and resting comfortably. 7:37 PM Urinalysis postive for nitrites, has bacteria on microscopic. Will treat with PO abx. Patient reports she has no allergies. Patient is a 49 y/o female who presented to the ED with symptoms consistent with UTI. She was treating at home with OTC azo with no improvement of symptoms. Urinalysis today demonstrates UTI. Patient has no allergies so will treat with Keflex and instructions to follow up with PCP. Patient verbalized her understanding.        Amount and/or Complexity of Data Reviewed  Clinical lab tests: ordered and reviewed    Risk of Complications, Morbidity, and/or Mortality  Presenting problems: minimal  Diagnostic procedures: minimal  Management options: minimal    Patient Progress  Patient progress: improved         Procedures

## 2021-03-18 NOTE — ED TRIAGE NOTES
Pt states \"I think I have a UTI. \" Pt complains of foul smelling urine with urinary pain and urgency. Pt arrived with mask from home in place.

## 2021-03-18 NOTE — ED NOTES
I have reviewed discharge instructions with the patient. The patient verbalized understanding. Patient left ED via Discharge Method: ambulatory to Home with self. Opportunity for questions and clarification provided. Patient given 1 script. To continue your aftercare when you leave the hospital, you may receive an automated call from our care team to check in on how you are doing. This is a free service and part of our promise to provide the best care and service to meet your aftercare needs.  If you have questions, or wish to unsubscribe from this service please call [phone]. Thank you for Choosing our [address] [place] [address] Emergency Department.

## 2021-12-26 ENCOUNTER — APPOINTMENT (OUTPATIENT)
Dept: GENERAL RADIOLOGY | Age: 56
End: 2021-12-26
Attending: EMERGENCY MEDICINE

## 2021-12-26 ENCOUNTER — HOSPITAL ENCOUNTER (EMERGENCY)
Age: 56
Discharge: HOME | End: 2021-12-26
Attending: EMERGENCY MEDICINE

## 2021-12-26 VITALS
BODY MASS INDEX: 34.86 KG/M2 | OXYGEN SATURATION: 99 % | DIASTOLIC BLOOD PRESSURE: 91 MMHG | HEART RATE: 83 BPM | RESPIRATION RATE: 16 BRPM | WEIGHT: 216.93 LBS | TEMPERATURE: 98.2 F | HEIGHT: 66 IN | SYSTOLIC BLOOD PRESSURE: 164 MMHG

## 2021-12-26 DIAGNOSIS — R09.81 NASAL CONGESTION: ICD-10-CM

## 2021-12-26 DIAGNOSIS — R05.9 COUGH: Primary | ICD-10-CM

## 2021-12-26 DIAGNOSIS — J01.00 ACUTE NON-RECURRENT MAXILLARY SINUSITIS: ICD-10-CM

## 2021-12-26 PROCEDURE — 99282 EMERGENCY DEPT VISIT SF MDM: CPT

## 2021-12-26 PROCEDURE — 71046 X-RAY EXAM CHEST 2 VIEWS: CPT

## 2021-12-26 RX ORDER — ALBUTEROL SULFATE 90 UG/1
1 AEROSOL, METERED RESPIRATORY (INHALATION)
Qty: 1 EACH | Refills: 0 | Status: SHIPPED | OUTPATIENT
Start: 2021-12-26 | End: 2022-01-09

## 2021-12-26 RX ORDER — INHALER, ASSIST DEVICES
1 SPACER (EA) MISCELLANEOUS AS NEEDED
Qty: 1 EACH | Refills: 0 | Status: SHIPPED | OUTPATIENT
Start: 2021-12-26

## 2021-12-26 RX ORDER — BENZONATATE 100 MG/1
100 CAPSULE ORAL
Qty: 30 CAPSULE | Refills: 0 | Status: SHIPPED | OUTPATIENT
Start: 2021-12-26 | End: 2022-01-05

## 2021-12-26 RX ORDER — AMOXICILLIN AND CLAVULANATE POTASSIUM 875; 125 MG/1; MG/1
1 TABLET, FILM COATED ORAL 2 TIMES DAILY
Qty: 14 TABLET | Refills: 0 | Status: SHIPPED | OUTPATIENT
Start: 2021-12-26 | End: 2022-01-02

## 2021-12-26 NOTE — Clinical Note
[address] [address] EMERGENCY DEPT  [address] [id number]  [phone]    Work/School Note    Date: 12/26/2021    To Whom It May concern:    Olivia Olmedo was seen and treated today in the emergency room by the following provider(s):  Attending Provider: Estefani Bruno MD  Physician Assistant: José Luis Rodriges. Olivia Olmedo is excused from work/school on 12/26/2021 through 12/28/2021. She is medically clear to return to work/school on 12/29/2021.          Sincerely,          José Luis Young

## 2021-12-26 NOTE — Clinical Note
[address] [address] EMERGENCY DEPT  [address] [facility] [id number] [phone]    Work/School Note    Date: 12/26/2021    To Whom It May concern:    Gris Haney was seen and treated today in the emergency room by the following provider(s):  Attending Provider: Talia Ordaz MD  Physician Assistant: Hayley Duenas, [address]. Gris Haney is excused from work/school on 12/26/2021 through 12/28/2021. She is medically clear to return to work/school on 12/29/2021.          Sincerely,          Randall Gimenez RN

## 2021-12-27 NOTE — ED NOTES
I have reviewed discharge instructions with the patient. The patient verbalized understanding. Patient left ED via Discharge Method: ambulatory to Home with self. Opportunity for questions and clarification provided. Patient given 4 scripts. To continue your aftercare when you leave the hospital, you may receive an automated call from our care team to check in on how you are doing. This is a free service and part of our promise to provide the best care and service to meet your aftercare needs.  If you have questions, or wish to unsubscribe from this service please call [phone]. Thank you for Choosing our [facility] Emergency Department.

## 2021-12-27 NOTE — ED TRIAGE NOTES
Pt states that she has had a cough x 10 days. States that she tested negative for COVID on Friday.  Masked on arrival

## 2021-12-27 NOTE — DISCHARGE INSTRUCTIONS
Take the antibiotic as prescribed, take Tessalon Perles every 8 hours as needed for cough. Use albuterol inhaler every 4-6 hours as needed for wheezing. Return for emergent symptoms, otherwise follow-up with primary doctor.

## 2022-01-25 ENCOUNTER — HOSPITAL ENCOUNTER (EMERGENCY)
Age: 57
Discharge: HOME | End: 2022-01-26

## 2022-01-25 ENCOUNTER — APPOINTMENT (OUTPATIENT)
Dept: GENERAL RADIOLOGY | Age: 57
End: 2022-01-25
Attending: EMERGENCY MEDICINE

## 2022-01-25 VITALS
RESPIRATION RATE: 18 BRPM | OXYGEN SATURATION: 100 % | HEART RATE: 96 BPM | DIASTOLIC BLOOD PRESSURE: 119 MMHG | SYSTOLIC BLOOD PRESSURE: 204 MMHG | BODY MASS INDEX: 34.07 KG/M2 | WEIGHT: 212 LBS | TEMPERATURE: 98.8 F | HEIGHT: 66 IN

## 2022-01-25 DIAGNOSIS — J20.9 ACUTE BRONCHITIS, UNSPECIFIED ORGANISM: Primary | ICD-10-CM

## 2022-01-25 LAB
ALBUMIN SERPL-MCNC: 3.7 G/DL (ref 3.5–5)
ALBUMIN/GLOB SERPL: 0.8 {RATIO} (ref 1.2–3.5)
ALP SERPL-CCNC: 71 U/L (ref 50–136)
ALT SERPL-CCNC: 33 U/L (ref 12–65)
ANION GAP SERPL CALC-SCNC: 5 MMOL/L (ref 7–16)
AST SERPL-CCNC: 11 U/L (ref 15–37)
BASOPHILS # BLD: 0 K/UL (ref 0–0.2)
BASOPHILS NFR BLD: 0 % (ref 0–2)
BILIRUB SERPL-MCNC: 0.2 MG/DL (ref 0.2–1.1)
BUN SERPL-MCNC: 8 MG/DL (ref 6–23)
CALCIUM SERPL-MCNC: 9.3 MG/DL (ref 8.3–10.4)
CHLORIDE SERPL-SCNC: 102 MMOL/L (ref 98–107)
CO2 SERPL-SCNC: 30 MMOL/L (ref 21–32)
CREAT SERPL-MCNC: 0.97 MG/DL (ref 0.6–1)
DIFFERENTIAL METHOD BLD: ABNORMAL
EOSINOPHIL # BLD: 0.1 K/UL (ref 0–0.8)
EOSINOPHIL NFR BLD: 2 % (ref 0.5–7.8)
ERYTHROCYTE [DISTWIDTH] IN BLOOD BY AUTOMATED COUNT: 13.7 % (ref 11.9–14.6)
GLOBULIN SER CALC-MCNC: 4.9 G/DL (ref 2.3–3.5)
GLUCOSE SERPL-MCNC: 336 MG/DL (ref 65–100)
HCT VFR BLD AUTO: 38.4 % (ref 35.8–46.3)
HGB BLD-MCNC: 12.3 G/DL (ref 11.7–15.4)
IMM GRANULOCYTES # BLD AUTO: 0 K/UL (ref 0–0.5)
IMM GRANULOCYTES NFR BLD AUTO: 0 % (ref 0–5)
LYMPHOCYTES # BLD: 2.5 K/UL (ref 0.5–4.6)
LYMPHOCYTES NFR BLD: 41 % (ref 13–44)
MCH RBC QN AUTO: 23.6 PG (ref 26.1–32.9)
MCHC RBC AUTO-ENTMCNC: 32 G/DL (ref 31.4–35)
MCV RBC AUTO: 73.7 FL (ref 79.6–97.8)
MONOCYTES # BLD: 0.5 K/UL (ref 0.1–1.3)
MONOCYTES NFR BLD: 9 % (ref 4–12)
NEUTS SEG # BLD: 3 K/UL (ref 1.7–8.2)
NEUTS SEG NFR BLD: 48 % (ref 43–78)
NRBC # BLD: 0 K/UL (ref 0–0.2)
PLATELET # BLD AUTO: 301 K/UL (ref 150–450)
PMV BLD AUTO: 11.2 FL (ref 9.4–12.3)
POTASSIUM SERPL-SCNC: 3.8 MMOL/L (ref 3.5–5.1)
PROT SERPL-MCNC: 8.6 G/DL (ref 6.3–8.2)
RBC # BLD AUTO: 5.21 M/UL (ref 4.05–5.2)
SODIUM SERPL-SCNC: 137 MMOL/L (ref 136–145)
WBC # BLD AUTO: 6.2 K/UL (ref 4.3–11.1)

## 2022-01-25 PROCEDURE — 80053 COMPREHEN METABOLIC PANEL: CPT

## 2022-01-25 PROCEDURE — 99283 EMERGENCY DEPT VISIT LOW MDM: CPT

## 2022-01-25 PROCEDURE — 85025 COMPLETE CBC W/AUTO DIFF WBC: CPT

## 2022-01-25 PROCEDURE — 71046 X-RAY EXAM CHEST 2 VIEWS: CPT

## 2022-01-25 NOTE — ED TRIAGE NOTES
PT arrives with complaints of cough that isnt getting better. Dx with Bronchitis 12/26. States coughing so hard she vomits at times. States chest tightens with coughing spells. States had a negative COVID today at work. States cant sleep.

## 2022-01-26 PROCEDURE — 74011636637 HC RX REV CODE- 636/637

## 2022-01-26 RX ORDER — PREDNISONE 10 MG/1
TABLET ORAL
Qty: 21 TABLET | Refills: 0 | OUTPATIENT
Start: 2022-01-26 | End: 2022-04-13

## 2022-01-26 RX ORDER — PREDNISONE 20 MG/1
60 TABLET ORAL
Status: COMPLETED | OUTPATIENT
Start: 2022-01-26 | End: 2022-01-26

## 2022-01-26 RX ORDER — PANTOPRAZOLE SODIUM 40 MG/1
40 TABLET, DELAYED RELEASE ORAL DAILY
Qty: 20 TABLET | Refills: 0 | Status: SHIPPED | OUTPATIENT
Start: 2022-01-26 | End: 2022-02-15

## 2022-01-26 RX ORDER — METOCLOPRAMIDE 10 MG/1
10 TABLET ORAL
Qty: 12 TABLET | Refills: 0 | Status: SHIPPED | OUTPATIENT
Start: 2022-01-26 | End: 2022-01-26 | Stop reason: SDUPTHER

## 2022-01-26 RX ORDER — METOCLOPRAMIDE 10 MG/1
10 TABLET ORAL
Qty: 12 TABLET | Refills: 0 | Status: SHIPPED | OUTPATIENT
Start: 2022-01-26 | End: 2022-02-05

## 2022-01-26 RX ORDER — AZITHROMYCIN 250 MG/1
TABLET, FILM COATED ORAL
Qty: 6 TABLET | Refills: 0 | OUTPATIENT
Start: 2022-01-26 | End: 2022-04-13

## 2022-01-26 RX ORDER — HYDROCODONE BITARTRATE AND HOMATROPINE METHYLBROMIDE 1.5; 5 MG/5ML; MG/5ML
5 SYRUP ORAL 4 TIMES DAILY
Qty: 45 ML | Refills: 0 | Status: SHIPPED | OUTPATIENT
Start: 2022-01-26 | End: 2022-01-29

## 2022-01-26 RX ORDER — PANTOPRAZOLE SODIUM 40 MG/1
40 TABLET, DELAYED RELEASE ORAL DAILY
Qty: 20 TABLET | Refills: 0 | Status: SHIPPED | OUTPATIENT
Start: 2022-01-26 | End: 2022-01-26 | Stop reason: SDUPTHER

## 2022-01-26 RX ADMIN — PREDNISONE 60 MG: 20 TABLET ORAL at 00:48

## 2022-01-26 NOTE — ED PROVIDER NOTES
53-year-old female complaining of persistent cough. Patient's been coughing since before Christmas. She was seen in outpatient clinic on December 26 diagnosed with bronchitis was placed on amoxicillin and an inhaler states that she finished the amoxicillin she is to continue to cough she also use Tessalon Perles. No fevers or chills but she does have sputum production    The history is provided by the patient. Cough  This is a new problem. The current episode started more than 1 week ago. The problem occurs constantly. The cough is productive of sputum. There has been no fever. Associated symptoms include sore throat and myalgias. Pertinent negatives include no chest pain and no chills. She has tried antibiotics, prescription drugs and inhalers for the symptoms. The treatment provided no relief. Her past medical history is significant for bronchitis and asthma. Her past medical history does not include pneumonia or heart failure. Past Medical History:   Diagnosis Date    Asthma     Diabetes ([facility] [address]     Endocrine disease     Hypothyroid    Other ill-defined conditions(799.89)     Fibromyalgia    UTI (urinary tract infection)        Past Surgical History:   Procedure Laterality Date    HX GYN      Tubal         No family history on file.     Social History     Socioeconomic History    Marital status:      Spouse name: Not on file    Number of children: Not on file    Years of education: Not on file    Highest education level: Not on file   Occupational History    Not on file   Tobacco Use    Smoking status: Never Smoker    Smokeless tobacco: Never Used   Substance and Sexual Activity    Alcohol use: No    Drug use: No    Sexual activity: Never     Birth control/protection: Surgical   Other Topics Concern    Not on file   Social History Narrative    Not on file     Social Determinants of Health     Financial Resource Strain:     Difficulty of Paying Living Expenses: Not on file Food Insecurity:     Worried About Running Out of Food in the Last Year: Not on file    Shital of Food in the Last Year: Not on file   Transportation Needs:     Lack of Transportation (Medical): Not on file    Lack of Transportation (Non-Medical): Not on file   Physical Activity:     Days of Exercise per Week: Not on file    Minutes of Exercise per Session: Not on file   Stress:     Feeling of Stress : Not on file   Social Connections:     Frequency of Communication with Friends and Family: Not on file    Frequency of Social Gatherings with Friends and Family: Not on file    Attends Quaker Services: Not on file    Active Member of [address] [place] [address] [address] [employer] or Organizations: Not on file    Attends Club or Organization Meetings: Not on file    Marital Status: Not on file   Intimate Partner Violence:     Fear of Current or Ex-Partner: Not on file    Emotionally Abused: Not on file    Physically Abused: Not on file    Sexually Abused: Not on file   Housing Stability:     Unable to Pay for Housing in the Last Year: Not on file    Number of [place] in the Last Year: Not on file    Unstable Housing in the Last Year: Not on file         ALLERGIES: Latex, Shellfish derived, Kiwi, Dominguez flavor, and Yellow dye    Review of Systems   Constitutional: Negative. Negative for activity change and chills. HENT: Positive for sore throat. Eyes: Negative. Respiratory: Positive for cough. Cardiovascular: Negative. Negative for chest pain. Gastrointestinal: Negative. Genitourinary: Negative. Musculoskeletal: Positive for myalgias. Skin: Negative. Neurological: Negative. Psychiatric/Behavioral: Negative. All other systems reviewed and are negative. Vitals:    01/25/22 1837   BP: (!) 204/119   Pulse: 96   Resp: 18   Temp: 98.8 °F (37.1 °C)   SpO2: 100%   Weight: 96.2 kg (212 lb)   Height: 5' 6\" (1.676 m)            Physical Exam  Vitals and nursing note reviewed.    Constitutional: General: She is not in acute distress. Appearance: She is well-developed. HENT:      Head: Normocephalic and atraumatic. Right Ear: External ear normal.      Left Ear: External ear normal.      Nose: Nose normal.   Eyes:      General: No scleral icterus. Right eye: No discharge. Left eye: No discharge. Conjunctiva/sclera: Conjunctivae normal.      Pupils: Pupils are equal, round, and reactive to light. Cardiovascular:      Rate and Rhythm: Regular rhythm. Pulmonary:      Effort: Pulmonary effort is normal. No respiratory distress. Breath sounds: Normal breath sounds. No stridor. No wheezing or rales. Abdominal:      General: Bowel sounds are normal. There is no distension. Palpations: Abdomen is soft. Tenderness: There is no abdominal tenderness. Musculoskeletal:         General: Normal range of motion. Cervical back: Normal range of motion. Skin:     General: Skin is warm and dry. Findings: No rash. Neurological:      Mental Status: She is alert and oriented to person, place, and time. Motor: No abnormal muscle tone. Coordination: Coordination normal.   Psychiatric:         Behavior: Behavior normal.          MDM  Number of Diagnoses or Management Options  Diagnosis management comments: Differential diagnosis includes but is not limited to: Pneumonia, bronchitis, viral illness, aspiration,    There is some problems with her keeping food down so may have an aspiration component to it place her on Protonix and Reglan along with the Z-Nahum and prednisone and Hycodan.        Amount and/or Complexity of Data Reviewed  Clinical lab tests: ordered and reviewed  Tests in the radiology section of CPT®: ordered and reviewed  Tests in the medicine section of CPT®: ordered and reviewed  Decide to obtain previous medical records or to obtain history from someone other than the patient: yes  Review and summarize past medical records: yes  Independent visualization of images, tracings, or specimens: yes    Risk of Complications, Morbidity, and/or Mortality  Presenting problems: high  Diagnostic procedures: high  Management options: high    Patient Progress  Patient progress: stable         Procedures

## 2022-01-26 NOTE — ED NOTES
I have reviewed discharge instructions with the patient. The patient verbalized understanding. Patient left ED via Discharge Method: ambulatory to Home with self. Opportunity for questions and clarification provided. Patient given 5 scripts. Medication explained to pt and pt v\u about medication. Pt in no acute distress at discharge     To continue your aftercare when you leave the hospital, you may receive an automated call from our care team to check in on how you are doing. This is a free service and part of our promise to provide the best care and service to meet your aftercare needs.  If you have questions, or wish to unsubscribe from this service please call [phone]. Thank you for Choosing our [facility] Emergency Department.

## 2022-03-19 PROBLEM — R09.81 NASAL CONGESTION: Status: ACTIVE | Noted: 2021-12-26

## 2022-03-19 PROBLEM — J01.00 ACUTE NON-RECURRENT MAXILLARY SINUSITIS: Status: ACTIVE | Noted: 2021-12-26

## 2022-03-20 PROBLEM — R05.9 COUGH: Status: ACTIVE | Noted: 2021-12-26

## 2022-04-13 ENCOUNTER — HOSPITAL ENCOUNTER (EMERGENCY)
Age: 57
Discharge: HOME | End: 2022-04-13
Attending: EMERGENCY MEDICINE

## 2022-04-13 ENCOUNTER — APPOINTMENT (OUTPATIENT)
Dept: GENERAL RADIOLOGY | Age: 57
End: 2022-04-13
Attending: EMERGENCY MEDICINE

## 2022-04-13 VITALS
WEIGHT: 200 LBS | TEMPERATURE: 98.5 F | OXYGEN SATURATION: 100 % | HEIGHT: 66 IN | RESPIRATION RATE: 16 BRPM | SYSTOLIC BLOOD PRESSURE: 149 MMHG | BODY MASS INDEX: 32.14 KG/M2 | HEART RATE: 82 BPM | DIASTOLIC BLOOD PRESSURE: 88 MMHG

## 2022-04-13 DIAGNOSIS — J20.9 ACUTE BRONCHITIS, UNSPECIFIED ORGANISM: Primary | ICD-10-CM

## 2022-04-13 LAB
GLUCOSE BLD STRIP.AUTO-MCNC: 99 MG/DL (ref 65–100)
SERVICE CMNT-IMP: NORMAL

## 2022-04-13 PROCEDURE — 82962 GLUCOSE BLOOD TEST: CPT

## 2022-04-13 PROCEDURE — 71046 X-RAY EXAM CHEST 2 VIEWS: CPT

## 2022-04-13 PROCEDURE — 99283 EMERGENCY DEPT VISIT LOW MDM: CPT

## 2022-04-13 RX ORDER — ALBUTEROL SULFATE 90 UG/1
2 AEROSOL, METERED RESPIRATORY (INHALATION)
Qty: 1 EACH | Refills: 0 | Status: SHIPPED | OUTPATIENT
Start: 2022-04-13

## 2022-04-13 RX ORDER — PREDNISONE 20 MG/1
60 TABLET ORAL DAILY
Qty: 12 TABLET | Refills: 0 | Status: SHIPPED | OUTPATIENT
Start: 2022-04-13 | End: 2022-04-17

## 2022-04-13 RX ORDER — AZITHROMYCIN 250 MG/1
TABLET, FILM COATED ORAL
Qty: 6 TABLET | Refills: 0 | Status: SHIPPED | OUTPATIENT
Start: 2022-04-13

## 2022-04-13 NOTE — ED TRIAGE NOTES
Pt presents ambulatory to triage with complaints of cough and sob. Pt reports constant cough since December. Was seen in Jan and dx'd with bronchitis. Pt reports not getting better and cough has increased.  Does not smoke

## 2022-04-13 NOTE — ED PROVIDER NOTES
Patient with a history of bronchitis diabetes. Her last round of bronchitis she was treated with albuterol inhaler antibiotic and steroid. Did cause her sugar to go up but was effective. Has been coughing for 2 to 3 weeks this time. Now having some production. No chest pain. No shortness of breath. No wheezing. Symptoms getting worse came in. The history is provided by the patient. No  was used. Cough  This is a new problem. The current episode started more than 1 week ago. The problem occurs constantly. The problem has been gradually worsening. The cough is productive of sputum. There has been no fever. Pertinent negatives include no chest pain, no chills, no sweats, no eye redness, no ear pain, no headaches, no rhinorrhea, no sore throat, no myalgias, no shortness of breath, no wheezing, no nausea and no vomiting. She has tried inhalers for the symptoms. The treatment provided mild relief. She is not a smoker. Her past medical history is significant for bronchitis. Past Medical History:   Diagnosis Date    Asthma     Diabetes ([facility] [address]     Endocrine disease     Hypothyroid    Other ill-defined conditions(799.89)     Fibromyalgia    UTI (urinary tract infection)        Past Surgical History:   Procedure Laterality Date    HX GYN      Tubal         No family history on file.     Social History     Socioeconomic History    Marital status:      Spouse name: Not on file    Number of children: Not on file    Years of education: Not on file    Highest education level: Not on file   Occupational History    Not on file   Tobacco Use    Smoking status: Never Smoker    Smokeless tobacco: Never Used   Substance and Sexual Activity    Alcohol use: No    Drug use: No    Sexual activity: Never     Birth control/protection: Surgical   Other Topics Concern    Not on file   Social History Narrative    Not on file     Social Determinants of Health     Financial Resource Strain:     Difficulty of Paying Living Expenses: Not on file   Food Insecurity:     Worried About Running Out of Food in the Last Year: Not on file    Shital of Food in the Last Year: Not on file   Transportation Needs:     Lack of Transportation (Medical): Not on file    Lack of Transportation (Non-Medical): Not on file   Physical Activity:     Days of Exercise per Week: Not on file    Minutes of Exercise per Session: Not on file   Stress:     Feeling of Stress : Not on file   Social Connections:     Frequency of Communication with Friends and Family: Not on file    Frequency of Social Gatherings with Friends and Family: Not on file    Attends Mandaeism Services: Not on file    Active Member of [address] [place] [address] or Organizations: Not on file    Attends Club or Organization Meetings: Not on file    Marital Status: Not on file   Intimate Partner Violence:     Fear of Current or Ex-Partner: Not on file    Emotionally Abused: Not on file    Physically Abused: Not on file    Sexually Abused: Not on file   Housing Stability:     Unable to Pay for Housing in the Last Year: Not on file    Number of [place] in the Last Year: Not on file    Unstable Housing in the Last Year: Not on file         ALLERGIES: Latex, Shellfish derived, Kiwi, Dominguez flavor, and Yellow dye    Review of Systems   Constitutional: Negative for chills and fever. HENT: Positive for congestion. Negative for ear pain, rhinorrhea and sore throat. Eyes: Negative for pain and redness. Respiratory: Positive for cough. Negative for chest tightness, shortness of breath and wheezing. Cardiovascular: Negative for chest pain and leg swelling. Gastrointestinal: Negative for abdominal pain, diarrhea, nausea and vomiting. Genitourinary: Negative for dysuria and hematuria. Musculoskeletal: Negative for back pain, gait problem, myalgias, neck pain and neck stiffness. Skin: Negative for color change and rash.    Neurological: Negative for weakness, numbness and headaches. Vitals:    04/13/22 0931   BP: (!) 149/88   Pulse: 82   Resp: 16   Temp: 98.5 °F (36.9 °C)   SpO2: 100%   Weight: 90.7 kg (200 lb)   Height: 5' 6\" (1.676 m)            Physical Exam  Constitutional:       Appearance: Normal appearance. She is well-developed. HENT:      Head: Normocephalic and atraumatic. Cardiovascular:      Rate and Rhythm: Normal rate and regular rhythm. Pulmonary:      Effort: Pulmonary effort is normal. No respiratory distress. Breath sounds: Normal breath sounds. No wheezing. Abdominal:      General: Bowel sounds are normal.      Palpations: Abdomen is soft. Tenderness: There is no abdominal tenderness. Musculoskeletal:         General: Normal range of motion. Cervical back: Normal range of motion and neck supple. Skin:     General: Skin is warm and dry. Neurological:      Mental Status: She is alert and oriented to person, place, and time. MDM  Number of Diagnoses or Management Options  Diagnosis management comments: Chest x-ray no acute. Will treat as bronchitis and discharge       Amount and/or Complexity of Data Reviewed  Tests in the radiology section of CPT®: ordered and reviewed    Patient Progress  Patient progress: stable         Procedures        XR CHEST PA LAT (Final result)  Result time 04/13/22 09:56:19  Final result by Sae Jaffe MD (04/13/22 09:56:19)                Impression:    No acute cardiopulmonary abnormality. Narrative:    EXAM: XR CHEST PA LAT   INDICATION: cough   COMPARISON: Chest radiograph, 1/25/2022     FINDINGS:   The cardiomediastinal silhouette is within normal limits. No focal parenchymal   process. No pleural effusion. No pneumothorax.  No acute osseous abnormality.

## 2022-04-13 NOTE — ED NOTES
I have reviewed discharge instructions with the patient. The patient verbalized understanding. Patient left ED via Discharge Method: ambulatory to Home with self. Opportunity for questions and clarification provided. Patient given 3 scripts. To continue your aftercare when you leave the hospital, you may receive an automated call from our care team to check in on how you are doing. This is a free service and part of our promise to provide the best care and service to meet your aftercare needs.  If you have questions, or wish to unsubscribe from this service please call [phone]. Thank you for Choosing our [facility] Emergency Department.

## 2022-06-28 ENCOUNTER — APPOINTMENT (OUTPATIENT)
Dept: GENERAL RADIOLOGY | Age: 57
End: 2022-06-28

## 2022-06-28 ENCOUNTER — HOSPITAL ENCOUNTER (EMERGENCY)
Age: 57
Discharge: HOME | End: 2022-06-28
Attending: EMERGENCY MEDICINE

## 2022-06-28 VITALS
TEMPERATURE: 98.4 F | HEART RATE: 86 BPM | RESPIRATION RATE: 15 BRPM | OXYGEN SATURATION: 98 % | BODY MASS INDEX: 40.18 KG/M2 | DIASTOLIC BLOOD PRESSURE: 83 MMHG | WEIGHT: 250 LBS | SYSTOLIC BLOOD PRESSURE: 137 MMHG | HEIGHT: 66 IN

## 2022-06-28 DIAGNOSIS — J40 BRONCHITIS: Primary | ICD-10-CM

## 2022-06-28 LAB
FLUAV AG NPH QL IA: NEGATIVE
FLUBV AG NPH QL IA: NEGATIVE
SARS-COV-2 RDRP RESP QL NAA+PROBE: NOT DETECTED
SOURCE: NORMAL
SPECIMEN SOURCE: NORMAL

## 2022-06-28 PROCEDURE — 87635 SARS-COV-2 COVID-19 AMP PRB: CPT

## 2022-06-28 PROCEDURE — 87804 INFLUENZA ASSAY W/OPTIC: CPT

## 2022-06-28 PROCEDURE — 99284 EMERGENCY DEPT VISIT MOD MDM: CPT

## 2022-06-28 PROCEDURE — 71046 X-RAY EXAM CHEST 2 VIEWS: CPT

## 2022-06-28 RX ORDER — BENZONATATE 200 MG/1
200 CAPSULE ORAL 3 TIMES DAILY PRN
Qty: 21 CAPSULE | Refills: 0 | Status: SHIPPED | OUTPATIENT
Start: 2022-06-28 | End: 2022-07-05

## 2022-06-28 RX ORDER — AMOXICILLIN 875 MG/1
875 TABLET, COATED ORAL 2 TIMES DAILY
Qty: 20 TABLET | Refills: 0 | Status: SHIPPED | OUTPATIENT
Start: 2022-06-28 | End: 2022-07-08

## 2022-06-28 ASSESSMENT — ENCOUNTER SYMPTOMS: COUGH: 1

## 2022-06-28 ASSESSMENT — PAIN SCALES - GENERAL: PAINLEVEL_OUTOF10: 5

## 2022-06-28 NOTE — ED NOTES
I have reviewed discharge instructions with the patient. The patient verbalized understanding. Patient left ED via Discharge Method: ambulatory to Home with self  Opportunity for questions and clarification provided. Patient given 2 scripts. To continue your aftercare when you leave the hospital, you may receive an automated call from our care team to check in on how you are doing. This is a free service and part of our promise to provide the best care and service to meet your aftercare needs.  If you have questions, or wish to unsubscribe from this service please call [phone]. Thank you for Choosing our [facility] Emergency Department.           Ludin Anne RN  06/28/22 8702

## 2022-06-28 NOTE — ED TRIAGE NOTES
Pt ambulatory unassisted to triage with mask in place. Pt complains of cough and congestion x2 weeks. Reports diagnosed Bronchitis.

## 2022-06-28 NOTE — ED PROVIDER NOTES
[facility] Emergency Department Provider Note                   PCP:                Zoe Castillo MD               Age: 49 y.o. Sex: female     No diagnosis found. DISPOSITION         New Prescriptions    No medications on file       Orders Placed This Encounter   Procedures    COVID-19, Rapid    Rapid influenza A/B antigens    XR CHEST (2 VW)         Ruddy Noble is a 49 y.o. female who presents to the Emergency Department with chief complaint of    Chief Complaint   Patient presents with    Cough      Pt to er c/o mildly productive cough for past few day, no fever, + h/o bronchitis    The history is provided by the patient. Cough  Cough characteristics:  Hacking  Sputum characteristics:  White  Severity:  Mild  Onset quality:  Gradual  Duration:  1 week  Timing:  Constant  Chronicity:  Recurrent  Smoker: no    Relieved by:  Nothing  Worsened by:  Nothing  Ineffective treatments:  Cough suppressants  Associated symptoms: myalgias    Associated symptoms: no fever        Review of Systems   Constitutional: Negative for fever. Respiratory: Positive for cough. Musculoskeletal: Positive for myalgias. All other systems reviewed and are negative. All other systems reviewed and are negative. Past Medical History:   Diagnosis Date    Asthma     Diabetes ([facility] [address]     Endocrine disease     Hypothyroid    Other ill-defined conditions(799.89)     Fibromyalgia    UTI (urinary tract infection)         Past Surgical History:   Procedure Laterality Date    GYN      Tubal        No family history on file.         Social Connections:     Frequency of Communication with Friends and Family: Not on file    Frequency of Social Gatherings with Friends and Family: Not on file    Attends Taoism Services: Not on file    Active Member of Clubs or Organizations: Not on file    Attends Club or Organization Meetings: Not on file    Marital Status: Not on file        Allergies   Allergen Reactions  Latex Anaphylaxis    Kiwi Extract Shortness Of Breath    Braden Flavor Shortness Of Breath    Yellow Dye Shortness Of Breath        Vitals signs and nursing note reviewed. Patient Vitals for the past 4 hrs:   Temp Pulse Resp BP SpO2   06/28/22 1508 98.4 °F (36.9 °C) 86 15 137/83 98 %          Physical Exam  Vitals and nursing note reviewed. Constitutional:       Appearance: Normal appearance. She is obese. HENT:      Head: Normocephalic and atraumatic. Right Ear: Tympanic membrane and external ear normal.      Left Ear: Tympanic membrane and external ear normal.      Nose: Congestion present. Mouth/Throat:      Mouth: Mucous membranes are moist.      Pharynx: Oropharynx is clear. Eyes:      Extraocular Movements: Extraocular movements intact. Pupils: Pupils are equal, round, and reactive to light. Cardiovascular:      Rate and Rhythm: Normal rate and regular rhythm. Pulses: Normal pulses. Heart sounds: Normal heart sounds. Pulmonary:      Effort: Pulmonary effort is normal.      Breath sounds: Normal breath sounds. No wheezing, rhonchi or rales. Chest:      Chest wall: No tenderness. Abdominal:      Tenderness: There is no abdominal tenderness. Hernia: No hernia is present. Musculoskeletal:         General: Normal range of motion. Cervical back: Normal range of motion and neck supple. Skin:     General: Skin is warm and dry. Neurological:      General: No focal deficit present. Mental Status: She is alert. Psychiatric:         Mood and Affect: Mood normal.         Behavior: Behavior normal.          MDM  Number of Diagnoses or Management Options  Diagnosis management comments: XR CHEST (2 VW)   Final Result    The lungs are clear. The heart size is normal in size. No    pneumothorax. No pleural effusions.        Labs Reviewed  COVID-19, RAPID  RAPID INFLUENZA A/B ANTIGENS       Amount and/or Complexity of Data Reviewed  Clinical lab tests: ordered and reviewed  Tests in the radiology section of CPT®: ordered and reviewed  Review and summarize past medical records: yes    Risk of Complications, Morbidity, and/or Mortality  Presenting problems: low  Diagnostic procedures: low  Management options: low    Patient Progress  Patient progress: improved      Procedures    Labs Reviewed   COVID-19, RAPID   RAPID INFLUENZA A/B ANTIGENS        XR CHEST (2 VW)   Final Result   The lungs are clear. The heart size is normal in size. No   pneumothorax. No pleural effusions. Jesus Alberto Coma Scale  Eye Opening: Spontaneous  Best Verbal Response: Oriented  Best Motor Response: Obeys commands  [place] Coma Scale Score: 15                     Voice dictation software was used during the making of this note. This software is not perfect and grammatical and other typographical errors may be present. This note has not been completely proofread for errors.      MANI Good  06/28/22 4446

## 2022-06-28 NOTE — Clinical Note
Obinna Chen was seen and treated in our emergency department on 6/28/2022. She may return to work on 06/29/2022. If you have any questions or concerns, please don't hesitate to call.       MANI Lin

## 2022-06-29 LAB — MAMMOGRAPHY, EXTERNAL: NORMAL

## 2022-09-14 ENCOUNTER — HOSPITAL ENCOUNTER (EMERGENCY)
Dept: GENERAL RADIOLOGY | Age: 57
Discharge: HOME | End: 2022-09-17

## 2022-09-14 ENCOUNTER — HOSPITAL ENCOUNTER (EMERGENCY)
Age: 57
Discharge: HOME | End: 2022-09-14
Attending: EMERGENCY MEDICINE

## 2022-09-14 VITALS
OXYGEN SATURATION: 96 % | HEART RATE: 81 BPM | BODY MASS INDEX: 34.07 KG/M2 | SYSTOLIC BLOOD PRESSURE: 137 MMHG | HEIGHT: 66 IN | DIASTOLIC BLOOD PRESSURE: 80 MMHG | RESPIRATION RATE: 18 BRPM | TEMPERATURE: 98.9 F | WEIGHT: 212 LBS

## 2022-09-14 DIAGNOSIS — K21.00 GASTROESOPHAGEAL REFLUX DISEASE WITH ESOPHAGITIS WITHOUT HEMORRHAGE: ICD-10-CM

## 2022-09-14 DIAGNOSIS — R11.2 NAUSEA AND VOMITING, INTRACTABILITY OF VOMITING NOT SPECIFIED, UNSPECIFIED VOMITING TYPE: Primary | ICD-10-CM

## 2022-09-14 LAB
ALBUMIN SERPL-MCNC: 3.7 G/DL (ref 3.5–5)
ALBUMIN/GLOB SERPL: 0.8 {RATIO} (ref 1.2–3.5)
ALP SERPL-CCNC: 59 U/L (ref 50–136)
ALT SERPL-CCNC: 35 U/L (ref 12–65)
ANION GAP SERPL CALC-SCNC: 4 MMOL/L (ref 4–13)
AST SERPL-CCNC: 16 U/L (ref 15–37)
BILIRUB SERPL-MCNC: <0.1 MG/DL (ref 0.2–1.1)
BUN SERPL-MCNC: 18 MG/DL (ref 6–23)
CALCIUM SERPL-MCNC: 9.5 MG/DL (ref 8.3–10.4)
CHLORIDE SERPL-SCNC: 104 MMOL/L (ref 101–110)
CO2 SERPL-SCNC: 30 MMOL/L (ref 21–32)
CREAT SERPL-MCNC: 0.94 MG/DL (ref 0.6–1)
EKG ATRIAL RATE: 74 BPM
EKG DIAGNOSIS: NORMAL
EKG P AXIS: 72 DEGREES
EKG P-R INTERVAL: 142 MS
EKG Q-T INTERVAL: 372 MS
EKG QRS DURATION: 92 MS
EKG QTC CALCULATION (BAZETT): 412 MS
EKG R AXIS: 63 DEGREES
EKG T AXIS: 41 DEGREES
EKG VENTRICULAR RATE: 74 BPM
ERYTHROCYTE [DISTWIDTH] IN BLOOD BY AUTOMATED COUNT: 14.2 % (ref 11.9–14.6)
GLOBULIN SER CALC-MCNC: 4.7 G/DL (ref 2.3–3.5)
GLUCOSE SERPL-MCNC: 127 MG/DL (ref 65–100)
HCT VFR BLD AUTO: 34 % (ref 35.8–46.3)
HGB BLD-MCNC: 10.8 G/DL (ref 11.7–15.4)
MCH RBC QN AUTO: 24.3 PG (ref 26.1–32.9)
MCHC RBC AUTO-ENTMCNC: 31.8 G/DL (ref 31.4–35)
MCV RBC AUTO: 76.4 FL (ref 79.6–97.8)
NRBC # BLD: 0 K/UL (ref 0–0.2)
PLATELET # BLD AUTO: 290 K/UL (ref 150–450)
PMV BLD AUTO: 10.9 FL (ref 9.4–12.3)
POTASSIUM SERPL-SCNC: 4 MMOL/L (ref 3.5–5.1)
PROT SERPL-MCNC: 8.4 G/DL (ref 6.3–8.2)
RBC # BLD AUTO: 4.45 M/UL (ref 4.05–5.2)
SARS-COV-2 RDRP RESP QL NAA+PROBE: NOT DETECTED
SODIUM SERPL-SCNC: 138 MMOL/L (ref 136–145)
SOURCE: NORMAL
TROPONIN I SERPL HS-MCNC: 4.6 PG/ML (ref 0–14)
WBC # BLD AUTO: 5.9 K/UL (ref 4.3–11.1)

## 2022-09-14 PROCEDURE — 85027 COMPLETE CBC AUTOMATED: CPT

## 2022-09-14 PROCEDURE — 80053 COMPREHEN METABOLIC PANEL: CPT

## 2022-09-14 PROCEDURE — 93005 ELECTROCARDIOGRAM TRACING: CPT | Performed by: STUDENT IN AN ORGANIZED HEALTH CARE EDUCATION/TRAINING PROGRAM

## 2022-09-14 PROCEDURE — 84484 ASSAY OF TROPONIN QUANT: CPT

## 2022-09-14 PROCEDURE — 87635 SARS-COV-2 COVID-19 AMP PRB: CPT

## 2022-09-14 PROCEDURE — 99285 EMERGENCY DEPT VISIT HI MDM: CPT

## 2022-09-14 PROCEDURE — 96374 THER/PROPH/DIAG INJ IV PUSH: CPT

## 2022-09-14 PROCEDURE — 71045 X-RAY EXAM CHEST 1 VIEW: CPT

## 2022-09-14 PROCEDURE — 6360000002 HC RX W HCPCS: Performed by: STUDENT IN AN ORGANIZED HEALTH CARE EDUCATION/TRAINING PROGRAM

## 2022-09-14 RX ORDER — ONDANSETRON 4 MG/1
4 TABLET, FILM COATED ORAL 3 TIMES DAILY PRN
Qty: 15 TABLET | Refills: 2 | Status: SHIPPED | OUTPATIENT
Start: 2022-09-14

## 2022-09-14 RX ORDER — OMEPRAZOLE 20 MG/1
20 CAPSULE, DELAYED RELEASE ORAL
Qty: 90 CAPSULE | Refills: 0 | Status: SHIPPED | OUTPATIENT
Start: 2022-09-14 | End: 2022-12-13

## 2022-09-14 RX ORDER — ONDANSETRON 2 MG/ML
4 INJECTION INTRAMUSCULAR; INTRAVENOUS
Status: COMPLETED | OUTPATIENT
Start: 2022-09-14 | End: 2022-09-14

## 2022-09-14 RX ORDER — FAMOTIDINE 20 MG/1
20 TABLET, FILM COATED ORAL 2 TIMES DAILY
Qty: 60 TABLET | Refills: 0 | Status: SHIPPED | OUTPATIENT
Start: 2022-09-14

## 2022-09-14 RX ADMIN — ONDANSETRON 4 MG: 2 INJECTION INTRAMUSCULAR; INTRAVENOUS at 09:59

## 2022-09-14 ASSESSMENT — ENCOUNTER SYMPTOMS
SORE THROAT: 0
SHORTNESS OF BREATH: 0
COUGH: 1
BLOOD IN STOOL: 0
EYE PAIN: 0
COLOR CHANGE: 0
CHEST TIGHTNESS: 0
RHINORRHEA: 0
VOMITING: 1
PHOTOPHOBIA: 0
WHEEZING: 0
CONSTIPATION: 0
APNEA: 0
ABDOMINAL PAIN: 0
EYE REDNESS: 0
SINUS PRESSURE: 0
VOICE CHANGE: 0
EYE DISCHARGE: 0
DIARRHEA: 0
NAUSEA: 1

## 2022-09-14 ASSESSMENT — PAIN - FUNCTIONAL ASSESSMENT: PAIN_FUNCTIONAL_ASSESSMENT: NONE - DENIES PAIN

## 2022-09-14 NOTE — ED NOTES
Patient advises that after she eats for past 3 weeks she will having a burning sensation followed by some coughing with mild amount of vomiting. Patient advises history of GERD but It was a long time ago. Patient denies any pain at this time or shortness of breath. Masked.       Shashank Cuevas RN  09/14/22 9805

## 2022-09-14 NOTE — ED PROVIDER NOTES
Emergency Department Provider Note                   PCP:                Tracy Zuniga MD               Age: 56 y.o. Sex: female       ICD-10-CM    1. Nausea and vomiting, intractability of vomiting not specified, unspecified vomiting type  R11.2       2. Gastroesophageal reflux disease with esophagitis without hemorrhage  K21.00           DISPOSITION Decision To Discharge 09/14/2022 11:14:16 AM        MDM  Number of Diagnoses or Management Options  Gastroesophageal reflux disease with esophagitis without hemorrhage  Nausea and vomiting, intractability of vomiting not specified, unspecified vomiting type  Diagnosis management comments: Suspect reflux. However given history of chest pain with vomiting we will proceed with chest pain work-up. Assuming all is normal, will treat for reflux. In summary this is a 61-year-old female patient presenting with symptoms most consistent with gastroesophageal reflux disease. Based on my evaluation I feel she is at low risk for alternative diagnoses such as acute abdominal pathology, ACS, other cardiac pathology. Reasoning behind the decision-making process as the patient has been experiencing symptoms ongoing for several weeks. They are worse at night and when she lays flat and after eating. She experiences a burning sensation and regurgitation which is consistent with GERD. We did do a cardiac work-up today which was unremarkable. Heart score of 1 indicating low risk. No pulse deficits. No chest wall tenderness. No difficulty breathing. Vital signs are stable without hypoxia or tachycardia. Plan for this patient is outpatient management with Pepcid, omeprazole. GI referral.  Specifically counseled the patient on warning signs return immediately for including but not limited to chest pain, dyspnea, fevers. Patient verbalized understanding and is in agreement with the treatment plan.   Patient history, ROS, physical exam, labs, radiological workup and all pertinent findings were discussed with attending González Dee MD.         Amount and/or Complexity of Data Reviewed  Clinical lab tests: ordered and reviewed  Tests in the radiology section of CPT®: ordered and reviewed  Independent visualization of images, tracings, or specimens: yes    Risk of Complications, Morbidity, and/or Mortality  Presenting problems: moderate  Diagnostic procedures: moderate  Management options: moderate    Patient Progress  Patient progress: stable  ===================================  ED EKG Interpretation  EKG was interpreted in the absence of a cardiologist.    Rate: Rate: Normal  EKG Interpretation: EKG Interpretation: sinus rhythm  ST Segments: Normal ST segments - NO STEMI    MANI DEGROOT 12:44 PM        Orders Placed This Encounter   Procedures    COVID-19, Rapid    XR CHEST PORTABLE    CBC    Comprehensive Metabolic Panel    Troponin    Cardiac Monitor    Pulse Oximetry    EKG 12 Lead    Saline lock IV        Medications   ondansetron (ZOFRAN) injection 4 mg (4 mg IntraVENous Given 9/14/22 0959)       Discharge Medication List as of 9/14/2022 11:22 AM        START taking these medications    Details   ondansetron (ZOFRAN) 4 MG tablet Take 1 tablet by mouth 3 times daily as needed for Nausea or Vomiting, Disp-15 tablet, R-2Print      omeprazole (PRILOSEC) 20 MG delayed release capsule Take 1 capsule by mouth every morning (before breakfast), Disp-90 capsule, R-0Print      famotidine (PEPCID) 20 MG tablet Take 1 tablet by mouth 2 times daily, Disp-60 tablet, R-0Print              [place] is a 56 y.o. female who presents to the Emergency Department with chief complaint of    Chief Complaint   Patient presents with    Nausea    Emesis      70-year-old female patient presents today complaining of intermittent chest burning with coughing and occasional emesis of stomach acid. Reports the symptoms are intermittent and moderate in severity.   States they are worse at night upon laying down and states that she has to sleep sitting up to relieve her symptoms. She reports this has been ongoing for the past 3 weeks. She describes the burning is going up and down the midline of her chest.  No current pain at this time. Denies dyspnea, dyspnea on exertion, chest pain on exertion, syncope, weakness, pain in arms, pain in jaw, diaphoresis. Denies abdominal pain, melena, blood in stool, fever, chills. Denies pleuritic pain, hemoptysis, recent travel, recent surgery, known malignancy, hormone use, history of blood clot. Patient does state that she had a long history of cough that she had been seen several times in this ER for until discovering that it was from her blood pressure medications. She states this cough she is experiencing is different. Denies all other symptoms today. Patient is primary historian and quality is reliable. The history is provided by the patient. No  was used. Review of Systems   Constitutional:  Negative for appetite change, chills, fatigue, fever and unexpected weight change. HENT:  Negative for congestion, ear pain, hearing loss, postnasal drip, rhinorrhea, sinus pressure, sore throat and voice change. Eyes:  Negative for photophobia, pain, discharge, redness and visual disturbance. Respiratory:  Positive for cough. Negative for apnea, chest tightness, shortness of breath and wheezing. Cardiovascular:  Negative for chest pain, palpitations and leg swelling. Gastrointestinal:  Positive for nausea and vomiting. Negative for abdominal pain, blood in stool, constipation and diarrhea. Endocrine: Negative for polydipsia, polyphagia and polyuria. Genitourinary:  Negative for decreased urine volume, dysuria, flank pain, frequency and hematuria. Musculoskeletal:  Negative for arthralgias, joint swelling, myalgias and neck stiffness. Skin:  Negative for color change, rash and wound.    Neurological:  Negative for dizziness, syncope, speech difficulty, weakness, light-headedness and headaches. Hematological:  Negative for adenopathy. Does not bruise/bleed easily. Psychiatric/Behavioral:  Negative for confusion, self-injury, sleep disturbance and suicidal ideas. All other systems reviewed and are negative. Past Medical History:   Diagnosis Date    Asthma     Diabetes ([facility] [address]     Endocrine disease     Hypothyroid    Other ill-defined conditions(799.89)     Fibromyalgia    UTI (urinary tract infection)         Past Surgical History:   Procedure Laterality Date    GYN      Tubal        History reviewed. No pertinent family history.      Social History     Socioeconomic History    Marital status:      Spouse name: None    Number of children: None    Years of education: None    Highest education level: None   Tobacco Use    Smoking status: Never    Smokeless tobacco: Never   Substance and Sexual Activity    Alcohol use: No    Drug use: No         Latex, Kiwi extract, Braden flavor, Shellfish allergy, and Yellow dye     Discharge Medication List as of 9/14/2022 11:22 AM        CONTINUE these medications which have NOT CHANGED    Details   albuterol sulfate  (90 Base) MCG/ACT inhaler Inhale 2 puffs into the lungs every 4 hours as neededHistorical Med      azithromycin (ZITHROMAX) 250 MG tablet Take 2 tabs day one, and one tab daily thereafter for total of 5 daysHistorical Med      cetirizine (ZYRTEC) 10 MG tablet Take by mouth dailyHistorical Med      Cholecalciferol 50 MCG (2000 UT) TABS Take by mouth dailyHistorical Med      ferrous sulfate (IRON 325) 325 (65 Fe) MG tablet Take by mouth every morning (before breakfast)Historical Med      levothyroxine (SYNTHROID) 150 MCG tablet Take 150 mcg by mouth every morning (before breakfast)Historical Med      losartan (COZAAR) 25 MG tablet Take 25 mg by mouth dailyHistorical Med      metFORMIN (GLUCOPHAGE) 500 MG tablet Take 1,000 mg by mouth 2 times daily (with meals)Historical Med              Vitals signs and nursing note reviewed. Patient Vitals for the past 4 hrs:   Temp Pulse Resp BP SpO2   09/14/22 1135 98.9 °F (37.2 °C) 81 18 137/80 96 %   09/14/22 1046 -- 96 15 (!) 180/83 100 %   09/14/22 0919 98.9 °F (37.2 °C) 91 16 (!) 174/72 100 %          Physical Exam  Vitals and nursing note reviewed. Constitutional:       General: She is not in acute distress. Appearance: Normal appearance. She is not ill-appearing, toxic-appearing or diaphoretic. Comments: Resting comfortably in the stretcher. Speaking in full sentences. Denies pain or nausea or discomfort at this time. No acute distress. HENT:      Head: Normocephalic and atraumatic. Right Ear: Tympanic membrane, ear canal and external ear normal.      Left Ear: Tympanic membrane, ear canal and external ear normal.      Nose: Nose normal.      Mouth/Throat:      Mouth: Mucous membranes are moist.   Eyes:      General: No scleral icterus. Right eye: No discharge. Left eye: No discharge. Conjunctiva/sclera: Conjunctivae normal.   Cardiovascular:      Rate and Rhythm: Normal rate and regular rhythm. Pulses: Normal pulses. Heart sounds: Normal heart sounds. No murmur heard. Pulmonary:      Effort: Pulmonary effort is normal. No respiratory distress. Breath sounds: Normal breath sounds. No stridor. No wheezing, rhonchi or rales. Chest:      Chest wall: No tenderness. Abdominal:      General: Abdomen is flat. Bowel sounds are normal.      Palpations: Abdomen is soft. Tenderness: There is no abdominal tenderness. There is no guarding or rebound. Musculoskeletal:         General: Normal range of motion. Cervical back: Normal range of motion and neck supple. No rigidity or tenderness. Right lower leg: No edema. Left lower leg: No edema. Lymphadenopathy:      Cervical: No cervical adenopathy. Skin:     General: Skin is warm and dry. Capillary Refill: Capillary refill takes less than 2 seconds. Coloration: Skin is not jaundiced. Neurological:      General: No focal deficit present. Mental Status: She is alert and oriented to person, place, and time. Mental status is at baseline. Psychiatric:         Mood and Affect: Mood normal.         Behavior: Behavior normal.         Thought Content: Thought content normal.         Judgment: Judgment normal.        Procedures    Results for orders placed or performed during the hospital encounter of 09/14/22   COVID-19, Rapid    Specimen: Nasopharyngeal   Result Value Ref Range    Source Nasopharyngeal      SARS-CoV-2, Rapid Not detected NOTD     XR CHEST PORTABLE    Narrative    CHEST X-RAY, 1 VIEW    INDICATION: Chest pain    COMPARISON: 6/28/2022    TECHNIQUE: Single AP view of the chest was obtained. FINDINGS:     Lungs: Normal.    Cardiomediastinal silhouette: Normal.    Pleura: No pneumothorax or pleural effusion. Osseous structures: Normal.        Impression    No radiographic evidence of acute cardiopulmonary disease.      CBC   Result Value Ref Range    WBC 5.9 4.3 - 11.1 K/uL    RBC 4.45 4.05 - 5.2 M/uL    Hemoglobin 10.8 (L) 11.7 - 15.4 g/dL    Hematocrit 34.0 (L) 35.8 - 46.3 %    MCV 76.4 (L) 79.6 - 97.8 FL    MCH 24.3 (L) 26.1 - 32.9 PG    MCHC 31.8 31.4 - 35.0 g/dL    RDW 14.2 11.9 - 14.6 %    Platelets 908 648 - 231 K/uL    MPV 10.9 9.4 - 12.3 FL    nRBC 0.00 0.0 - 0.2 K/uL   Comprehensive Metabolic Panel   Result Value Ref Range    Sodium 138 136 - 145 mmol/L    Potassium 4.0 3.5 - 5.1 mmol/L    Chloride 104 101 - 110 mmol/L    CO2 30 21 - 32 mmol/L    Anion Gap 4 4 - 13 mmol/L    Glucose 127 (H) 65 - 100 mg/dL    BUN 18 6 - 23 MG/DL    Creatinine 0.94 0.6 - 1.0 MG/DL    GFR African American >60 >60 ml/min/1.73m2    GFR Non- >60 >60 ml/min/1.73m2    Calcium 9.5 8.3 - 10.4 MG/DL    Total Bilirubin <0.1 (L) 0.2 - 1.1 MG/DL    ALT 35 12 - 65 U/L    AST 16 15 - 37 U/L    Alk Phosphatase 59 50 - 136 U/L    Total Protein 8.4 (H) 6.3 - 8.2 g/dL    Albumin 3.7 3.5 - 5.0 g/dL    Globulin 4.7 (H) 2.3 - 3.5 g/dL    Albumin/Globulin Ratio 0.8 (L) 1.2 - 3.5     Troponin   Result Value Ref Range    Troponin, High Sensitivity 4.6 0 - 14 pg/mL   EKG 12 Lead   Result Value Ref Range    Ventricular Rate 74 BPM    Atrial Rate 74 BPM    P-R Interval 142 ms    QRS Duration 92 ms    Q-T Interval 372 ms    QTc Calculation (Bazett) 412 ms    P Axis 72 degrees    R Axis 63 degrees    T Axis 41 degrees    Diagnosis          XR CHEST PORTABLE   Final Result   No radiographic evidence of acute cardiopulmonary disease. ED Course as of 09/14/22 1245   Wed Sep 14, 2022   1036 10:36 AM  Normal chest x ray [NR]      ED Course User Index  [NR] MANI Crow        Voice dictation software was used during the making of this note. This software is not perfect and grammatical and other typographical errors may be present. This note has not been completely proofread for errors.      Marilynn Crow  09/14/22 1245

## 2022-09-14 NOTE — DISCHARGE INSTRUCTIONS
Your work-up today was largely unremarkable. The enzymes of your heart were normal.  Your EKG was normal.  Your chest x-ray was normal.  I suspect the symptoms you are experiencing is due to reflux. As discussed we will treat this with a course of Pepcid and omeprazole and Zofran for nausea. Take the Pepcid for the first 2 weeks. The omeprazole will take about 2 weeks to work. Use Zofran for nausea. Sleep with the head of the bed elevated. Avoid eating within 3 hours of planning to go to bed. Do not lie down after eating. Avoid food triggers such as alcohol, caffeine, chocolate. Call gastroenterologist for further management to schedule an appointment. Return for any new or worsening symptoms, especially chest pain or trouble breathing.

## 2022-09-14 NOTE — ED NOTES
I have reviewed discharge instructions with the patient. The patient verbalized understanding. Patient left ED via Discharge Method: ambulatory to Home with self. Opportunity for questions and clarification provided. Patient given 3 scripts. To continue your aftercare when you leave the hospital, you may receive an automated call from our care team to check in on how you are doing. This is a free service and part of our promise to provide the best care and service to meet your aftercare needs.  If you have questions, or wish to unsubscribe from this service please call [phone]. Thank you for Choosing our [facility] Emergency Department.         Vazquez Palafox RN  09/14/22 2895

## 2022-09-14 NOTE — Clinical Note
Jimy Ferrari was seen and treated in our emergency department on 9/14/2022. She may return to work on 09/15/2022. If you have any questions or concerns, please don't hesitate to call.       Marilynn Nolasco

## 2022-12-12 ENCOUNTER — HOSPITAL ENCOUNTER (EMERGENCY)
Age: 57
Discharge: HOME | End: 2022-12-12
Attending: EMERGENCY MEDICINE

## 2022-12-12 ENCOUNTER — HOSPITAL ENCOUNTER (EMERGENCY)
Dept: GENERAL RADIOLOGY | Age: 57
Discharge: HOME | End: 2022-12-15

## 2022-12-12 VITALS
SYSTOLIC BLOOD PRESSURE: 134 MMHG | DIASTOLIC BLOOD PRESSURE: 77 MMHG | OXYGEN SATURATION: 99 % | RESPIRATION RATE: 16 BRPM | HEART RATE: 87 BPM | TEMPERATURE: 98.1 F

## 2022-12-12 DIAGNOSIS — R05.3 CHRONIC COUGH: Primary | ICD-10-CM

## 2022-12-12 DIAGNOSIS — R06.2 WHEEZING: ICD-10-CM

## 2022-12-12 DIAGNOSIS — J45.41 MODERATE PERSISTENT ASTHMA WITH EXACERBATION: ICD-10-CM

## 2022-12-12 LAB
ALBUMIN SERPL-MCNC: 3.8 G/DL (ref 3.5–5)
ALBUMIN/GLOB SERPL: 0.7 {RATIO} (ref 0.4–1.6)
ALP SERPL-CCNC: 62 U/L (ref 50–136)
ALT SERPL-CCNC: 37 U/L (ref 12–65)
ANION GAP SERPL CALC-SCNC: 3 MMOL/L (ref 2–11)
AST SERPL-CCNC: 18 U/L (ref 15–37)
BILIRUB SERPL-MCNC: 0.2 MG/DL (ref 0.2–1.1)
BUN SERPL-MCNC: 11 MG/DL (ref 6–23)
CALCIUM SERPL-MCNC: 9.8 MG/DL (ref 8.3–10.4)
CHLORIDE SERPL-SCNC: 103 MMOL/L (ref 101–110)
CO2 SERPL-SCNC: 31 MMOL/L (ref 21–32)
CREAT SERPL-MCNC: 1.13 MG/DL (ref 0.6–1)
ERYTHROCYTE [DISTWIDTH] IN BLOOD BY AUTOMATED COUNT: 14.1 % (ref 11.9–14.6)
GLOBULIN SER CALC-MCNC: 5.4 G/DL (ref 2.8–4.5)
GLUCOSE SERPL-MCNC: 160 MG/DL (ref 65–100)
HCT VFR BLD AUTO: 35.8 % (ref 35.8–46.3)
HGB BLD-MCNC: 11.4 G/DL (ref 11.7–15.4)
MCH RBC QN AUTO: 23.7 PG (ref 26.1–32.9)
MCHC RBC AUTO-ENTMCNC: 31.8 G/DL (ref 31.4–35)
MCV RBC AUTO: 74.4 FL (ref 82–102)
NRBC # BLD: 0 K/UL (ref 0–0.2)
PLATELET # BLD AUTO: 336 K/UL (ref 150–450)
PMV BLD AUTO: 10.1 FL (ref 9.4–12.3)
POTASSIUM SERPL-SCNC: 3.6 MMOL/L (ref 3.5–5.1)
PROT SERPL-MCNC: 9.2 G/DL (ref 6.3–8.2)
RBC # BLD AUTO: 4.81 M/UL (ref 4.05–5.2)
SODIUM SERPL-SCNC: 137 MMOL/L (ref 133–143)
WBC # BLD AUTO: 7.1 K/UL (ref 4.3–11.1)

## 2022-12-12 PROCEDURE — 94640 AIRWAY INHALATION TREATMENT: CPT

## 2022-12-12 PROCEDURE — 93005 ELECTROCARDIOGRAM TRACING: CPT | Performed by: EMERGENCY MEDICINE

## 2022-12-12 PROCEDURE — 6370000000 HC RX 637 (ALT 250 FOR IP): Performed by: EMERGENCY MEDICINE

## 2022-12-12 PROCEDURE — 80053 COMPREHEN METABOLIC PANEL: CPT

## 2022-12-12 PROCEDURE — 85027 COMPLETE CBC AUTOMATED: CPT

## 2022-12-12 PROCEDURE — 71045 X-RAY EXAM CHEST 1 VIEW: CPT

## 2022-12-12 PROCEDURE — 99285 EMERGENCY DEPT VISIT HI MDM: CPT

## 2022-12-12 RX ORDER — AMLODIPINE BESYLATE 10 MG/1
10 TABLET ORAL DAILY
COMMUNITY

## 2022-12-12 RX ORDER — IPRATROPIUM BROMIDE AND ALBUTEROL SULFATE 2.5; .5 MG/3ML; MG/3ML
1 SOLUTION RESPIRATORY (INHALATION)
Status: COMPLETED | OUTPATIENT
Start: 2022-12-12 | End: 2022-12-12

## 2022-12-12 RX ADMIN — IPRATROPIUM BROMIDE AND ALBUTEROL SULFATE 1 AMPULE: .5; 3 SOLUTION RESPIRATORY (INHALATION) at 21:27

## 2022-12-12 ASSESSMENT — ENCOUNTER SYMPTOMS
COLOR CHANGE: 0
FACIAL SWELLING: 0
ABDOMINAL PAIN: 0
COUGH: 1
DIARRHEA: 0
SHORTNESS OF BREATH: 1
NAUSEA: 0
EYE DISCHARGE: 0
WHEEZING: 1

## 2022-12-12 ASSESSMENT — PAIN SCALES - GENERAL: PAINLEVEL_OUTOF10: 0

## 2022-12-12 ASSESSMENT — PAIN - FUNCTIONAL ASSESSMENT: PAIN_FUNCTIONAL_ASSESSMENT: NONE - DENIES PAIN

## 2022-12-12 NOTE — ED TRIAGE NOTES
Pt ambulatory to triage with c/o SOB, chest congestion, and productive cough. Pt reports recently being diagnosed with asthma and GERD with chronic cough. Pt reports she has an upcoming appointment with a pulmonologist. Pt reports increased mucus buildup after eating and at night.

## 2022-12-13 LAB
EKG ATRIAL RATE: 82 BPM
EKG DIAGNOSIS: NORMAL
EKG P AXIS: 60 DEGREES
EKG P-R INTERVAL: 146 MS
EKG Q-T INTERVAL: 388 MS
EKG QRS DURATION: 88 MS
EKG QTC CALCULATION (BAZETT): 453 MS
EKG R AXIS: 39 DEGREES
EKG T AXIS: 33 DEGREES
EKG VENTRICULAR RATE: 82 BPM

## 2022-12-13 NOTE — ED PROVIDER NOTES
[facility] Emergency Department Provider Note                   PCP:                Janette Escamilla MD               Age: 56 y.o. Sex: female     Chief Complaint   Patient presents with    Shortness of Breath       Is a 51-year-old female presenting to the emergency department complaining primarily of cough and cough resulting in shortness of breath. The patient states that she has a 1 year history of cough that was initially started with being placed on losartan. After about 6 months it was determined that losartan was causing her cough she was taken off. Subsequently she was sent to gastroenterology and diagnosed with GERD was and GERD induced cough. This was in November of this past month. However the patient also notes that she has asthma and she has been wheezing. She feels like recently her cough has been so significant that it makes her feel short of breath. She denies any chest pain, no fevers, no recent sick contacts, no runny nose congestion. Review of Systems   Constitutional:  Negative for activity change, appetite change and fever. HENT:  Negative for congestion and facial swelling. Eyes:  Negative for discharge. Respiratory:  Positive for cough, shortness of breath and wheezing. Cardiovascular:  Negative for chest pain and leg swelling. Gastrointestinal:  Negative for abdominal pain, diarrhea and nausea. Genitourinary:  Negative for dysuria. Musculoskeletal:  Negative for neck pain. Skin:  Negative for color change and rash. Neurological:  Negative for dizziness, numbness and headaches. Past Medical History:   Diagnosis Date    Asthma     Chronic cough     Diabetes ([address]     Endocrine disease     Hypothyroid    GERD (gastroesophageal reflux disease)     Other ill-defined conditions(799.89)     Fibromyalgia    UTI (urinary tract infection)         Past Surgical History:   Procedure Laterality Date    GYN      Tubal        History reviewed.  No pertinent family history. Social History     Socioeconomic History    Marital status:      Spouse name: None    Number of children: None    Years of education: None    Highest education level: None   Tobacco Use    Smoking status: Never    Smokeless tobacco: Never   Substance and Sexual Activity    Alcohol use: No    Drug use: No         Latex, Kiwi extract, Heppner flavor, Shellfish allergy, and Yellow dye     Previous Medications    ALBUTEROL SULFATE  (90 BASE) MCG/ACT INHALER    Inhale 2 puffs into the lungs every 4 hours as needed    AMLODIPINE (NORVASC) 10 MG TABLET    Take 10 mg by mouth daily    AZITHROMYCIN (ZITHROMAX) 250 MG TABLET    Take 2 tabs day one, and one tab daily thereafter for total of 5 days    CETIRIZINE (ZYRTEC) 10 MG TABLET    Take by mouth daily    CHOLECALCIFEROL 50 MCG (2000 UT) TABS    Take by mouth daily    FAMOTIDINE (PEPCID) 20 MG TABLET    Take 1 tablet by mouth 2 times daily    FERROUS SULFATE (IRON 325) 325 (65 FE) MG TABLET    Take by mouth every morning (before breakfast)    FLUTICASONE-SALMETEROL (ADVAIR HFA) 115-21 MCG/ACT INHALER    Inhale 2 puffs into the lungs 2 times daily    LEVOTHYROXINE (SYNTHROID) 150 MCG TABLET    Take 150 mcg by mouth every morning (before breakfast)    LOSARTAN (COZAAR) 25 MG TABLET    Take 25 mg by mouth daily    METFORMIN (GLUCOPHAGE) 500 MG TABLET    Take 1,000 mg by mouth 2 times daily (with meals)    OMEPRAZOLE (PRILOSEC) 20 MG DELAYED RELEASE CAPSULE    Take 1 capsule by mouth every morning (before breakfast)    ONDANSETRON (ZOFRAN) 4 MG TABLET    Take 1 tablet by mouth 3 times daily as needed for Nausea or Vomiting        Vitals signs and nursing note reviewed. Patient Vitals for the past 4 hrs:   Pulse Resp SpO2   12/12/22 2129 78 16 100 %          Physical Exam  Vitals and nursing note reviewed. Constitutional:       General: She is not in acute distress. Appearance: Normal appearance. She is normal weight.  She is not ill-appearing or toxic-appearing. HENT:      Head: Normocephalic and atraumatic. Mouth/Throat:      Mouth: Mucous membranes are moist.   Eyes:      Extraocular Movements: Extraocular movements intact. Cardiovascular:      Rate and Rhythm: Normal rate and regular rhythm. Pulses: Normal pulses. Heart sounds: Normal heart sounds. Pulmonary:      Effort: Pulmonary effort is normal. No accessory muscle usage or respiratory distress. Breath sounds: Examination of the right-upper field reveals decreased breath sounds and wheezing. Examination of the left-upper field reveals decreased breath sounds and wheezing. Examination of the right-middle field reveals decreased breath sounds and wheezing. Examination of the left-middle field reveals decreased breath sounds and wheezing. Examination of the right-lower field reveals decreased breath sounds and wheezing. Examination of the left-lower field reveals decreased breath sounds and wheezing. Decreased breath sounds and wheezing present. Abdominal:      General: There is no distension. Palpations: Abdomen is soft. Tenderness: There is no abdominal tenderness. Musculoskeletal:      Cervical back: Normal range of motion and neck supple. Skin:     General: Skin is dry. Findings: No rash. Neurological:      General: No focal deficit present. Mental Status: She is alert and oriented to person, place, and time. Mental status is at baseline.    Psychiatric:         Mood and Affect: Mood normal.         Behavior: Behavior normal.        Procedures    Labs Reviewed   CBC - Abnormal; Notable for the following components:       Result Value    Hemoglobin 11.4 (*)     MCV 74.4 (*)     MCH 23.7 (*)     All other components within normal limits   COMPREHENSIVE METABOLIC PANEL - Abnormal; Notable for the following components:    Glucose 160 (*)     Creatinine 1.13 (*)     Est, Glom Filt Rate 57 (*)     Total Protein 9.2 (*)     Globulin 5.4 (*)     All other components within normal limits        XR CHEST PORTABLE   Final Result           MDM  Number of Diagnoses or Management Options  Chronic cough  Moderate persistent asthma with exacerbation  Wheezing  Diagnosis management comments: 9:09 PM  Patient's exam consistent with acute asthma exacerbation. Will treat with DuoNeb. However will not give steroids at this time given her tenuous diabetic status. The last time she took glucocorticoids she states she ended up with an A1c of 15 and a sugar in the mid 600s. 10:06 PM  After DuoNeb, the patient has significantly improved aeration, she no longer has prolonged expiratory wheezing. She states she feels much better, she states it is easier to breathe, her cough is improved. We will discharge her with a prescription for additional albuterol nebs. Amount and/or Complexity of Data Reviewed  Clinical lab tests: reviewed and ordered  Tests in the radiology section of CPT®: ordered and reviewed    Risk of Complications, Morbidity, and/or Mortality  Presenting problems: high  Diagnostic procedures: high  Management options: high        DISPOSITION Decision To Discharge 12/12/2022 10:08:12 PM      ICD-10-CM    1. Chronic cough  R05.3       2. Wheezing  R06.2       3. Moderate persistent asthma with exacerbation  J45.41               Voice dictation software was used during the making of this note. This software is not perfect and grammatical and other typographical errors may be present. This note has not been completely proofread for errors.        Washington Moraes MD  12/12/22 9432

## 2022-12-13 NOTE — ED NOTES
I have reviewed discharge instructions with the patient. The patient verbalized understanding. Patient left ED via Discharge Method: ambulatory to Home with self. Opportunity for questions and clarification provided. Patient given 1 scripts. To continue your aftercare when you leave the hospital, you may receive an automated call from our care team to check in on how you are doing. This is a free service and part of our promise to provide the best care and service to meet your aftercare needs.  If you have questions, or wish to unsubscribe from this service please call [phone]. Thank you for Choosing our [facility] Emergency Department.       Shilpi Gar RN  12/12/22 6325

## 2023-03-08 DIAGNOSIS — J98.01 BRONCHOSPASM: Primary | ICD-10-CM

## 2023-03-10 ENCOUNTER — OFFICE VISIT (OUTPATIENT)
Dept: PULMONOLOGY | Age: 58
End: 2023-03-10

## 2023-03-10 ENCOUNTER — HOSPITAL ENCOUNTER (OUTPATIENT)
Dept: GENERAL RADIOLOGY | Age: 58
Discharge: HOME | End: 2023-03-10
Payer: COMMERCIAL

## 2023-03-10 VITALS
HEIGHT: 70 IN | OXYGEN SATURATION: 97 % | SYSTOLIC BLOOD PRESSURE: 120 MMHG | DIASTOLIC BLOOD PRESSURE: 80 MMHG | HEART RATE: 77 BPM | BODY MASS INDEX: 28.35 KG/M2 | RESPIRATION RATE: 20 BRPM | WEIGHT: 198 LBS | TEMPERATURE: 98 F

## 2023-03-10 DIAGNOSIS — R05.3 CHRONIC COUGH: ICD-10-CM

## 2023-03-10 DIAGNOSIS — J98.01 BRONCHOSPASM: Primary | ICD-10-CM

## 2023-03-10 DIAGNOSIS — K21.9 GASTROESOPHAGEAL REFLUX DISEASE, UNSPECIFIED WHETHER ESOPHAGITIS PRESENT: ICD-10-CM

## 2023-03-10 DIAGNOSIS — Z87.09 HISTORY OF ASTHMA: ICD-10-CM

## 2023-03-10 DIAGNOSIS — J98.01 BRONCHOSPASM: ICD-10-CM

## 2023-03-10 LAB
EXPIRATORY TIME: NORMAL
FEF 25-75% %PRED-PRE: NORMAL
FEF 25-75% PRED: NORMAL
FEF 25-75%-PRE: NORMAL
FEV1 %PRED-PRE: 63 %
FEV1 PRED: NORMAL
FEV1/FVC %PRED-PRE: NORMAL
FEV1/FVC PRED: NORMAL
FEV1/FVC: 79 %
FEV1: 1.75 L
FVC %PRED-PRE: 65 %
FVC PRED: NORMAL
FVC: 2.22 L
PEF %PRED-PRE: NORMAL
PEF PRED: NORMAL
PEF-PRE: NORMAL

## 2023-03-10 PROCEDURE — 71046 X-RAY EXAM CHEST 2 VIEWS: CPT

## 2023-03-10 RX ORDER — HYDROCHLOROTHIAZIDE 25 MG/1
25 TABLET ORAL DAILY
COMMUNITY

## 2023-03-10 RX ORDER — POTASSIUM CHLORIDE 750 MG/1
TABLET, FILM COATED, EXTENDED RELEASE ORAL
COMMUNITY
Start: 2022-07-16

## 2023-03-10 RX ORDER — LIRAGLUTIDE 6 MG/ML
1.2 INJECTION SUBCUTANEOUS DAILY
COMMUNITY

## 2023-03-10 RX ORDER — ATORVASTATIN CALCIUM 10 MG/1
10 TABLET, FILM COATED ORAL DAILY
COMMUNITY

## 2023-03-10 RX ORDER — CITALOPRAM 20 MG/1
20 TABLET ORAL DAILY
COMMUNITY

## 2023-03-10 ASSESSMENT — PULMONARY FUNCTION TESTS
FEV1_PERCENT_PREDICTED_PRE: 63
FEV1: 1.75
FEV1/FVC: 79
FVC_PERCENT_PREDICTED_PRE: 65
FVC: 2.22

## 2023-03-10 NOTE — PROGRESS NOTES
[address] Nal[address], [address] [facility]  [phone]    Patient Name:  Jayda Triplett  YOB: 1965      Office Visit 3/10/2023    CHIEF COMPLAINT:    Chief Complaint   Patient presents with    Cough       HISTORY OF PRESENT ILLNESS:    A delightful 72-year-old -American female presents for evaluation of chronic coughing. She tells me that for at least a year she has had this persistent coughing, initially thought due to angiotensin receptor blocker which was stopped and indeed with some improvement in her coughing but no resolution she was then seen after a severe bout in the ER where she was diagnosed with reflux disease and heartburn and referred to GI and after undergoing evaluation by them was diagnosed with gastroesophageal reflux and placed on twice a day proton pump inhibitor. That almost completely resolved the coughing and the patient and she very rarely has any coughing at the time of presentation today. She tells me that she has a history of asthma as a child but she outgrew it and is not on any inhalers and in fact at some point she was placed on Advair and albuterol by her primary doctor but that did not resolve her coughing up until she started using twice a day proton pump inhibitors. She denies postnasal drainage, she is a lifelong non-smoker. There is no history of exposure to asbestos, no history of exposure to birds, she has a pet she to dog with no problems while interacting with it from the respiratory standpoint.       Past Medical History:   Diagnosis Date    Asthma     Chronic cough     Diabetes ([address]     Endocrine disease     Hypothyroid    GERD (gastroesophageal reflux disease)     Other ill-defined conditions(799.89)     Fibromyalgia    UTI (urinary tract infection)          Patient Active Problem List   Diagnosis    Acute non-recurrent maxillary sinusitis    Nasal congestion    Cough           Past Surgical History: Procedure Laterality Date    GYN      Tubal         Social History     Socioeconomic History    Marital status:      Spouse name: Not on file    Number of children: Not on file    Years of education: Not on file    Highest education level: Not on file   Occupational History    Not on file   Tobacco Use    Smoking status: Never    Smokeless tobacco: Never   Substance and Sexual Activity    Alcohol use: No    Drug use: No    Sexual activity: Not on file   Other Topics Concern    Not on file   Social History Narrative    Not on file     Social Determinants of Health     Financial Resource Strain: Not on file   Food Insecurity: Not on file   Transportation Needs: Not on file   Physical Activity: Not on file   Stress: Not on file   Social Connections: Not on file   Intimate Partner Violence: Not on file   Housing Stability: Not on file         No family history on file.       Allergies   Allergen Reactions    Latex Anaphylaxis    Kiwi Extract Shortness Of Breath    [place] Flavor Shortness Of Breath    Shellfish Allergy Anaphylaxis    Yellow Dye Shortness Of Breath         Current Outpatient Medications   Medication Sig    atorvastatin (LIPITOR) 10 MG tablet Take 10 mg by mouth daily    citalopram (CELEXA) 20 MG tablet Take 20 mg by mouth daily    empagliflozin (JARDIANCE) 25 MG tablet Take 25 mg by mouth daily    hydroCHLOROthiazide (HYDRODIURIL) 25 MG tablet Take 25 mg by mouth daily    Liraglutide (VICTOZA) 18 MG/3ML SOPN SC injection Inject 1.2 mg into the skin daily    amLODIPine (NORVASC) 10 MG tablet Take 10 mg by mouth daily    albuterol (PROVENTIL) (5 MG/ML) 0.5% nebulizer solution Take 0.5 mLs by nebulization every 6 hours as needed for Wheezing    omeprazole (PRILOSEC) 20 MG delayed release capsule Take 1 capsule by mouth every morning (before breakfast)    albuterol sulfate  (90 Base) MCG/ACT inhaler Inhale 2 puffs into the lungs every 4 hours as needed    levothyroxine (SYNTHROID) 150 MCG tablet Take 150 mcg by mouth every morning (before breakfast)    metFORMIN (GLUCOPHAGE) 500 MG tablet Take 1,000 mg by mouth 2 times daily (with meals)    potassium chloride (KLOR-CON) 10 MEQ extended release tablet potassium chloride ER 10 mEq tablet,extended release   TAKE 1 TABLET BY MOUTH EVERY DAY (Patient not taking: Reported on 3/10/2023)    fluticasone-salmeterol (ADVAIR HFA) 115-21 MCG/ACT inhaler Inhale 2 puffs into the lungs 2 times daily (Patient not taking: Reported on 3/10/2023)    ondansetron (ZOFRAN) 4 MG tablet Take 1 tablet by mouth 3 times daily as needed for Nausea or Vomiting (Patient not taking: Reported on 3/10/2023)    famotidine (PEPCID) 20 MG tablet Take 1 tablet by mouth 2 times daily (Patient not taking: Reported on 3/10/2023)    azithromycin (ZITHROMAX) 250 MG tablet Take 2 tabs day one, and one tab daily thereafter for total of 5 days (Patient not taking: No sig reported)    cetirizine (ZYRTEC) 10 MG tablet Take by mouth daily (Patient not taking: Reported on 3/10/2023)    Cholecalciferol 50 MCG (2000 UT) TABS Take by mouth daily (Patient not taking: Reported on 3/10/2023)    ferrous sulfate (IRON 325) 325 (65 Fe) MG tablet Take by mouth every morning (before breakfast) (Patient not taking: Reported on 3/10/2023)    losartan (COZAAR) 25 MG tablet Take 25 mg by mouth daily (Patient not taking: No sig reported)     No current facility-administered medications for this visit. Review of Systems          PHYSICAL EXAM:    Vitals:    03/10/23 0910   BP: 120/80   Pulse: 77   Resp: 20   Temp: 98 °F (36.7 °C)   SpO2: 97%        GENERAL APPEARANCE:   The patient is slightly over weight and in no respiratory distress. HEENT:   PERRL. Conjunctivae unremarkable. Nasal mucosa is without epistaxis, exudate, or polyps. Gums and dentition are unremarkable. There is no oropharyngeal narrowing. TMs are clear. NECK/LYMPHATIC:   Symmetrical with no elevation of jugular venous pulsation. Trachea midline. No thyroid enlargement. No cervical adenopathy. LUNGS:   Normal respiratory effort with symmetrical lung expansion. Breath sounds clear to auscultation bilaterally with no rhonchi wheezing or crackles. HEART:   There is a regular rate and rhythm. No murmur, rub, or gallop. There is no edema in the lower extremities. ABDOMEN:   Soft and non-tender. No hepatosplenomegaly. Bowel sounds are normal.     SKIN:   There are no rashes, cyanosis, jaundice, or ecchymosis present. EXTREMITIES:   The extremities are unremarkable without clubbing, cyanosis, joint inflammation, degenerative, or ischemic change. MUSCULOSKELETAL:   There is no abnormal tone, muscle atrophy, or abnormal movement present. NEURO:   The patient is alert and oriented to person, place, and time. Memory appears intact and mood is normal.  No gross sensorimotor deficits are present. DIAGNOSTIC TESTS:         Spirometry:         The patient had technical difficulty performing the test properly taking that into consideration in the shape of the flow-volume loop the current values likely underestimate the patient's true pulmonary function. FeNO testing March 10, 2023:  Fractional exhaled nitric oxide testing was performed with level of 8 ppb. This is indicative of low likelihood of eosinophilic inflammation/asthma. Marianne Hernandez MD.      ASSESSMENT:  (Medical Decision Making)       Patient Active Problem List   Diagnosis    Acute non-recurrent maxillary sinusitis    Nasal congestion    Cough           PLAN:  Encounter Diagnoses   Name Primary?     Bronchospasm Yes    Chronic cough     Gastroesophageal reflux disease, unspecified whether esophagitis present     History of asthma    The patient has chronic coughing, now based on history and response to proton pump inhibitor, confirmation of reflux disease by GI and the none resolution with cessation of angiotensin receptor blocker as well as treatment for asthma very most likely caused by chronic reflux disease. In addition the patient's fractional excretion of nitric oxide in the office was 8 which makes asthma unlikely as a cause of the patient's cough. Angiotensin receptor blockers are not really known to cause chronic coughing although they can very rarely do so. She is now on a calcium channel blocker and that is a known issue. She does not complain of postnasal drainage and her childhood asthma which he outgrew seems to be stable. I discussed this with the patient and her daughter who accompanied her today in detail. I discussed with the patient reflux strategies including not eating or drinking anything besides water at least 2 hours prior to bedtime, elevation of the head of the bed and elimination or limitation of foods that are known to cause reflux and the patient and she seemed to understand. She is already on twice a day proton pump inhibitor which has helped resolve the symptoms and therefore no further intervention or treatment or investigation is not necessary. Any questions asked to me were answered seemingly to the patient's satisfaction and to the best of my ability. Total time spent was 60 minutes, the patient will return to the office for follow-up in 6 months to see how she is doing. She was also instructed on the signs and symptoms of postnasal drip especially with the current pollen season and her history of childhood asthma and remedies to mitigate this and avoid recurrence of chronic coughing were discussed with her in detail. Orders Placed This Encounter   Procedures    NITRIC OXIDE  GAS DETERMINATION    Spirometry Without Bronchodilator       No orders of the defined types were placed in this encounter. Ludin Mireles MD    Dictated using voice recognition software.  Proofread, but unrecognized voice recognition errors may exist.

## 2023-05-08 ENCOUNTER — APPOINTMENT (OUTPATIENT)
Dept: CT IMAGING | Age: 58
End: 2023-05-08
Payer: COMMERCIAL

## 2023-05-08 ENCOUNTER — HOSPITAL ENCOUNTER (INPATIENT)
Age: 58
LOS: 4 days | Discharge: HOME | End: 2023-05-12
Attending: EMERGENCY MEDICINE | Admitting: FAMILY MEDICINE
Payer: COMMERCIAL

## 2023-05-08 DIAGNOSIS — Z09 HOSPITAL DISCHARGE FOLLOW-UP: ICD-10-CM

## 2023-05-08 DIAGNOSIS — R10.9 ACUTE ABDOMINAL PAIN: Primary | ICD-10-CM

## 2023-05-08 DIAGNOSIS — E11.69 TYPE 2 DIABETES MELLITUS WITH OTHER SPECIFIED COMPLICATION, WITH LONG-TERM CURRENT USE OF INSULIN (HCC): ICD-10-CM

## 2023-05-08 DIAGNOSIS — Z79.4 TYPE 2 DIABETES MELLITUS WITH OTHER SPECIFIED COMPLICATION, WITH LONG-TERM CURRENT USE OF INSULIN (HCC): ICD-10-CM

## 2023-05-08 DIAGNOSIS — K85.30 DRUG-INDUCED ACUTE PANCREATITIS, UNSPECIFIED COMPLICATION STATUS: ICD-10-CM

## 2023-05-08 PROBLEM — A04.8 HELICOBACTER PYLORI INFECTION: Status: ACTIVE | Noted: 2023-05-08

## 2023-05-08 PROBLEM — R05.9 COUGH: Status: RESOLVED | Noted: 2021-12-26 | Resolved: 2023-05-08

## 2023-05-08 PROBLEM — K21.9 GASTROESOPHAGEAL REFLUX DISEASE WITHOUT ESOPHAGITIS: Status: ACTIVE | Noted: 2023-02-16

## 2023-05-08 PROBLEM — E66.09 CLASS 1 OBESITY DUE TO EXCESS CALORIES WITH SERIOUS COMORBIDITY AND BODY MASS INDEX (BMI) OF 31.0 TO 31.9 IN ADULT: Chronic | Status: ACTIVE | Noted: 2021-02-08

## 2023-05-08 PROBLEM — K85.90 ACUTE PANCREATITIS WITHOUT INFECTION OR NECROSIS: Status: ACTIVE | Noted: 2023-05-08

## 2023-05-08 PROBLEM — E78.5 HYPERLIPIDEMIA: Status: ACTIVE | Noted: 2022-02-03

## 2023-05-08 PROBLEM — E66.811 CLASS 1 OBESITY DUE TO EXCESS CALORIES WITH SERIOUS COMORBIDITY AND BODY MASS INDEX (BMI) OF 31.0 TO 31.9 IN ADULT: Chronic | Status: ACTIVE | Noted: 2021-02-08

## 2023-05-08 PROBLEM — E11.65 TYPE 2 DIABETES MELLITUS WITH HYPERGLYCEMIA, WITHOUT LONG-TERM CURRENT USE OF INSULIN (HCC): Status: ACTIVE | Noted: 2021-02-08

## 2023-05-08 PROBLEM — F33.9 CHRONIC RECURRENT MAJOR DEPRESSIVE DISORDER (HCC): Status: ACTIVE | Noted: 2022-05-27

## 2023-05-08 PROBLEM — R09.81 NASAL CONGESTION: Status: RESOLVED | Noted: 2021-12-26 | Resolved: 2023-05-08

## 2023-05-08 PROBLEM — J01.00 ACUTE NON-RECURRENT MAXILLARY SINUSITIS: Status: RESOLVED | Noted: 2021-12-26 | Resolved: 2023-05-08

## 2023-05-08 PROBLEM — E66.9 OBESITY WITH BODY MASS INDEX 30 OR GREATER: Status: ACTIVE | Noted: 2021-02-08

## 2023-05-08 PROBLEM — J45.40 MODERATE PERSISTENT ASTHMA: Status: ACTIVE | Noted: 2022-10-05

## 2023-05-08 PROBLEM — I10 PRIMARY HYPERTENSION: Chronic | Status: ACTIVE | Noted: 2021-02-08

## 2023-05-08 PROBLEM — I10 HYPERTENSION: Status: ACTIVE | Noted: 2021-02-08

## 2023-05-08 LAB
ALBUMIN SERPL-MCNC: 3.6 G/DL (ref 3.5–5)
ALBUMIN/GLOB SERPL: 0.7 (ref 0.4–1.6)
ALP SERPL-CCNC: 66 U/L (ref 50–136)
ALT SERPL-CCNC: 33 U/L (ref 12–65)
ANION GAP SERPL CALC-SCNC: 12 MMOL/L (ref 2–11)
AST SERPL-CCNC: 19 U/L (ref 15–37)
BASOPHILS # BLD: 0 K/UL (ref 0–0.2)
BASOPHILS NFR BLD: 0 % (ref 0–2)
BILIRUB SERPL-MCNC: 0.6 MG/DL (ref 0.2–1.1)
BUN SERPL-MCNC: 16 MG/DL (ref 6–23)
CALCIUM SERPL-MCNC: 8.8 MG/DL (ref 8.3–10.4)
CHLORIDE SERPL-SCNC: 99 MMOL/L (ref 101–110)
CO2 SERPL-SCNC: 26 MMOL/L (ref 21–32)
CREAT SERPL-MCNC: 1.2 MG/DL (ref 0.6–1)
DIFFERENTIAL METHOD BLD: ABNORMAL
EKG ATRIAL RATE: 111 BPM
EKG DIAGNOSIS: NORMAL
EKG P AXIS: 62 DEGREES
EKG P-R INTERVAL: 133 MS
EKG Q-T INTERVAL: 351 MS
EKG QRS DURATION: 90 MS
EKG QTC CALCULATION (BAZETT): 477 MS
EKG R AXIS: 56 DEGREES
EKG T AXIS: -38 DEGREES
EKG VENTRICULAR RATE: 111 BPM
EOSINOPHIL # BLD: 0.1 K/UL (ref 0–0.8)
EOSINOPHIL NFR BLD: 1 % (ref 0.5–7.8)
ERYTHROCYTE [DISTWIDTH] IN BLOOD BY AUTOMATED COUNT: 14.5 % (ref 11.9–14.6)
EST. AVERAGE GLUCOSE BLD GHB EST-MCNC: 206 MG/DL
GLOBULIN SER CALC-MCNC: 5 G/DL (ref 2.8–4.5)
GLUCOSE BLD STRIP.AUTO-MCNC: 209 MG/DL (ref 65–100)
GLUCOSE SERPL-MCNC: 280 MG/DL (ref 65–100)
HBA1C MFR BLD: 8.8 % (ref 4.8–5.6)
HCT VFR BLD AUTO: 44.5 % (ref 35.8–46.3)
HGB BLD-MCNC: 14.2 G/DL (ref 11.7–15.4)
IMM GRANULOCYTES # BLD AUTO: 0 K/UL (ref 0–0.5)
IMM GRANULOCYTES NFR BLD AUTO: 0 % (ref 0–5)
LIPASE SERPL-CCNC: 1419 U/L (ref 73–393)
LYMPHOCYTES # BLD: 0.6 K/UL (ref 0.5–4.6)
LYMPHOCYTES NFR BLD: 6 % (ref 13–44)
MCH RBC QN AUTO: 23.4 PG (ref 26.1–32.9)
MCHC RBC AUTO-ENTMCNC: 31.9 G/DL (ref 31.4–35)
MCV RBC AUTO: 73.4 FL (ref 82–102)
MONOCYTES # BLD: 0.4 K/UL (ref 0.1–1.3)
MONOCYTES NFR BLD: 4 % (ref 4–12)
NEUTS SEG # BLD: 8.9 K/UL (ref 1.7–8.2)
NEUTS SEG NFR BLD: 89 % (ref 43–78)
NRBC # BLD: 0 K/UL (ref 0–0.2)
PLATELET # BLD AUTO: 343 K/UL (ref 150–450)
PMV BLD AUTO: 10.3 FL (ref 9.4–12.3)
POTASSIUM SERPL-SCNC: 3.8 MMOL/L (ref 3.5–5.1)
PROT SERPL-MCNC: 8.6 G/DL (ref 6.3–8.2)
RBC # BLD AUTO: 6.06 M/UL (ref 4.05–5.2)
SERVICE CMNT-IMP: ABNORMAL
SODIUM SERPL-SCNC: 137 MMOL/L (ref 133–143)
TROPONIN I SERPL HS-MCNC: <3 PG/ML (ref 0–14)
TROPONIN I SERPL HS-MCNC: <3 PG/ML (ref 0–14)
WBC # BLD AUTO: 9.9 K/UL (ref 4.3–11.1)

## 2023-05-08 PROCEDURE — 96361 HYDRATE IV INFUSION ADD-ON: CPT

## 2023-05-08 PROCEDURE — 80053 COMPREHEN METABOLIC PANEL: CPT

## 2023-05-08 PROCEDURE — 1100000000 HC RM PRIVATE

## 2023-05-08 PROCEDURE — 84484 ASSAY OF TROPONIN QUANT: CPT

## 2023-05-08 PROCEDURE — 2580000003 HC RX 258: Performed by: EMERGENCY MEDICINE

## 2023-05-08 PROCEDURE — 6360000002 HC RX W HCPCS: Performed by: EMERGENCY MEDICINE

## 2023-05-08 PROCEDURE — 82962 GLUCOSE BLOOD TEST: CPT

## 2023-05-08 PROCEDURE — 6370000000 HC RX 637 (ALT 250 FOR IP): Performed by: FAMILY MEDICINE

## 2023-05-08 PROCEDURE — 83690 ASSAY OF LIPASE: CPT

## 2023-05-08 PROCEDURE — 6360000002 HC RX W HCPCS: Performed by: FAMILY MEDICINE

## 2023-05-08 PROCEDURE — 99285 EMERGENCY DEPT VISIT HI MDM: CPT

## 2023-05-08 PROCEDURE — 85025 COMPLETE CBC W/AUTO DIFF WBC: CPT

## 2023-05-08 PROCEDURE — 74177 CT ABD & PELVIS W/CONTRAST: CPT

## 2023-05-08 PROCEDURE — 96374 THER/PROPH/DIAG INJ IV PUSH: CPT

## 2023-05-08 PROCEDURE — 83036 HEMOGLOBIN GLYCOSYLATED A1C: CPT

## 2023-05-08 PROCEDURE — 6360000004 HC RX CONTRAST MEDICATION: Performed by: EMERGENCY MEDICINE

## 2023-05-08 PROCEDURE — 96375 TX/PRO/DX INJ NEW DRUG ADDON: CPT

## 2023-05-08 PROCEDURE — 6370000000 HC RX 637 (ALT 250 FOR IP): Performed by: EMERGENCY MEDICINE

## 2023-05-08 PROCEDURE — 94640 AIRWAY INHALATION TREATMENT: CPT

## 2023-05-08 PROCEDURE — A4216 STERILE WATER/SALINE, 10 ML: HCPCS | Performed by: FAMILY MEDICINE

## 2023-05-08 PROCEDURE — C9113 INJ PANTOPRAZOLE SODIUM, VIA: HCPCS | Performed by: FAMILY MEDICINE

## 2023-05-08 PROCEDURE — 2580000003 HC RX 258: Performed by: FAMILY MEDICINE

## 2023-05-08 PROCEDURE — 36415 COLL VENOUS BLD VENIPUNCTURE: CPT

## 2023-05-08 RX ORDER — INSULIN LISPRO 100 [IU]/ML
0-8 INJECTION, SOLUTION INTRAVENOUS; SUBCUTANEOUS
Status: DISCONTINUED | OUTPATIENT
Start: 2023-05-09 | End: 2023-05-12 | Stop reason: HOSPADM

## 2023-05-08 RX ORDER — IBUPROFEN 600 MG/1
1 TABLET ORAL PRN
Status: DISCONTINUED | OUTPATIENT
Start: 2023-05-08 | End: 2023-05-12 | Stop reason: HOSPADM

## 2023-05-08 RX ORDER — ONDANSETRON 4 MG/1
4 TABLET, ORALLY DISINTEGRATING ORAL EVERY 8 HOURS PRN
Status: DISCONTINUED | OUTPATIENT
Start: 2023-05-08 | End: 2023-05-12 | Stop reason: HOSPADM

## 2023-05-08 RX ORDER — HYDROCHLOROTHIAZIDE 25 MG/1
25 TABLET ORAL DAILY
Status: DISCONTINUED | OUTPATIENT
Start: 2023-05-09 | End: 2023-05-12 | Stop reason: HOSPADM

## 2023-05-08 RX ORDER — INSULIN LISPRO 100 [IU]/ML
0-4 INJECTION, SOLUTION INTRAVENOUS; SUBCUTANEOUS NIGHTLY
Status: DISCONTINUED | OUTPATIENT
Start: 2023-05-08 | End: 2023-05-12 | Stop reason: HOSPADM

## 2023-05-08 RX ORDER — SODIUM CHLORIDE 9 MG/ML
INJECTION, SOLUTION INTRAVENOUS PRN
Status: DISCONTINUED | OUTPATIENT
Start: 2023-05-08 | End: 2023-05-12 | Stop reason: HOSPADM

## 2023-05-08 RX ORDER — SODIUM CHLORIDE 0.9 % (FLUSH) 0.9 %
5-40 SYRINGE (ML) INJECTION PRN
Status: DISCONTINUED | OUTPATIENT
Start: 2023-05-08 | End: 2023-05-12 | Stop reason: HOSPADM

## 2023-05-08 RX ORDER — SODIUM CHLORIDE 0.9 % (FLUSH) 0.9 %
5-40 SYRINGE (ML) INJECTION EVERY 12 HOURS SCHEDULED
Status: DISCONTINUED | OUTPATIENT
Start: 2023-05-08 | End: 2023-05-12 | Stop reason: HOSPADM

## 2023-05-08 RX ORDER — MORPHINE SULFATE 2 MG/ML
2 INJECTION, SOLUTION INTRAMUSCULAR; INTRAVENOUS
Status: DISCONTINUED | OUTPATIENT
Start: 2023-05-08 | End: 2023-05-11

## 2023-05-08 RX ORDER — MORPHINE SULFATE 4 MG/ML
4 INJECTION, SOLUTION INTRAMUSCULAR; INTRAVENOUS
Status: DISCONTINUED | OUTPATIENT
Start: 2023-05-08 | End: 2023-05-11

## 2023-05-08 RX ORDER — POTASSIUM CHLORIDE 7.45 MG/ML
10 INJECTION INTRAVENOUS PRN
Status: DISCONTINUED | OUTPATIENT
Start: 2023-05-08 | End: 2023-05-12 | Stop reason: HOSPADM

## 2023-05-08 RX ORDER — DEXTROSE MONOHYDRATE 100 MG/ML
INJECTION, SOLUTION INTRAVENOUS CONTINUOUS PRN
Status: DISCONTINUED | OUTPATIENT
Start: 2023-05-08 | End: 2023-05-12 | Stop reason: HOSPADM

## 2023-05-08 RX ORDER — ONDANSETRON 2 MG/ML
4 INJECTION INTRAMUSCULAR; INTRAVENOUS EVERY 6 HOURS PRN
Status: DISCONTINUED | OUTPATIENT
Start: 2023-05-08 | End: 2023-05-12 | Stop reason: HOSPADM

## 2023-05-08 RX ORDER — INSULIN GLARGINE 100 [IU]/ML
0.25 INJECTION, SOLUTION SUBCUTANEOUS NIGHTLY
Status: DISCONTINUED | OUTPATIENT
Start: 2023-05-08 | End: 2023-05-11

## 2023-05-08 RX ORDER — ONDANSETRON 2 MG/ML
4 INJECTION INTRAMUSCULAR; INTRAVENOUS
Status: COMPLETED | OUTPATIENT
Start: 2023-05-08 | End: 2023-05-08

## 2023-05-08 RX ORDER — SODIUM CHLORIDE, SODIUM LACTATE, POTASSIUM CHLORIDE, CALCIUM CHLORIDE 600; 310; 30; 20 MG/100ML; MG/100ML; MG/100ML; MG/100ML
INJECTION, SOLUTION INTRAVENOUS CONTINUOUS
Status: DISCONTINUED | OUTPATIENT
Start: 2023-05-08 | End: 2023-05-09

## 2023-05-08 RX ORDER — ENOXAPARIN SODIUM 100 MG/ML
40 INJECTION SUBCUTANEOUS EVERY 24 HOURS
Status: DISCONTINUED | OUTPATIENT
Start: 2023-05-08 | End: 2023-05-12 | Stop reason: HOSPADM

## 2023-05-08 RX ORDER — POTASSIUM CHLORIDE 20 MEQ/1
40 TABLET, EXTENDED RELEASE ORAL PRN
Status: DISCONTINUED | OUTPATIENT
Start: 2023-05-08 | End: 2023-05-12 | Stop reason: HOSPADM

## 2023-05-08 RX ORDER — LEVOTHYROXINE SODIUM 0.07 MG/1
150 TABLET ORAL
Status: DISCONTINUED | OUTPATIENT
Start: 2023-05-09 | End: 2023-05-12 | Stop reason: HOSPADM

## 2023-05-08 RX ORDER — SODIUM CHLORIDE, SODIUM LACTATE, POTASSIUM CHLORIDE, CALCIUM CHLORIDE 600; 310; 30; 20 MG/100ML; MG/100ML; MG/100ML; MG/100ML
INJECTION, SOLUTION INTRAVENOUS CONTINUOUS
Status: DISCONTINUED | OUTPATIENT
Start: 2023-05-09 | End: 2023-05-11

## 2023-05-08 RX ORDER — AMLODIPINE BESYLATE 10 MG/1
10 TABLET ORAL DAILY
Status: DISCONTINUED | OUTPATIENT
Start: 2023-05-09 | End: 2023-05-12 | Stop reason: HOSPADM

## 2023-05-08 RX ORDER — MAGNESIUM SULFATE IN WATER 40 MG/ML
2000 INJECTION, SOLUTION INTRAVENOUS PRN
Status: DISCONTINUED | OUTPATIENT
Start: 2023-05-08 | End: 2023-05-12 | Stop reason: HOSPADM

## 2023-05-08 RX ORDER — SODIUM CHLORIDE 0.9 % (FLUSH) 0.9 %
10 SYRINGE (ML) INJECTION
Status: COMPLETED | OUTPATIENT
Start: 2023-05-08 | End: 2023-05-08

## 2023-05-08 RX ORDER — 0.9 % SODIUM CHLORIDE 0.9 %
100 INTRAVENOUS SOLUTION INTRAVENOUS
Status: COMPLETED | OUTPATIENT
Start: 2023-05-08 | End: 2023-05-08

## 2023-05-08 RX ORDER — CITALOPRAM 20 MG/1
20 TABLET ORAL DAILY
Status: DISCONTINUED | OUTPATIENT
Start: 2023-05-09 | End: 2023-05-12 | Stop reason: HOSPADM

## 2023-05-08 RX ORDER — SODIUM CHLORIDE, SODIUM LACTATE, POTASSIUM CHLORIDE, AND CALCIUM CHLORIDE .6; .31; .03; .02 G/100ML; G/100ML; G/100ML; G/100ML
1000 INJECTION, SOLUTION INTRAVENOUS ONCE
Status: COMPLETED | OUTPATIENT
Start: 2023-05-08 | End: 2023-05-08

## 2023-05-08 RX ORDER — ATORVASTATIN CALCIUM 10 MG/1
10 TABLET, FILM COATED ORAL DAILY
Status: DISCONTINUED | OUTPATIENT
Start: 2023-05-09 | End: 2023-05-12 | Stop reason: HOSPADM

## 2023-05-08 RX ADMIN — HYDROMORPHONE HYDROCHLORIDE 1 MG: 1 INJECTION, SOLUTION INTRAMUSCULAR; INTRAVENOUS; SUBCUTANEOUS at 15:37

## 2023-05-08 RX ADMIN — INSULIN GLARGINE 22 UNITS: 100 INJECTION, SOLUTION SUBCUTANEOUS at 21:02

## 2023-05-08 RX ADMIN — ENOXAPARIN SODIUM 40 MG: 100 INJECTION SUBCUTANEOUS at 17:50

## 2023-05-08 RX ADMIN — ONDANSETRON 4 MG: 2 INJECTION INTRAMUSCULAR; INTRAVENOUS at 13:15

## 2023-05-08 RX ADMIN — SODIUM CHLORIDE, PRESERVATIVE FREE 10 ML: 5 INJECTION INTRAVENOUS at 14:15

## 2023-05-08 RX ADMIN — ALBUTEROL SULFATE 2.5 MG: 2.5 SOLUTION RESPIRATORY (INHALATION) at 21:15

## 2023-05-08 RX ADMIN — SODIUM CHLORIDE 40 MG: 9 INJECTION INTRAMUSCULAR; INTRAVENOUS; SUBCUTANEOUS at 21:01

## 2023-05-08 RX ADMIN — HYDROMORPHONE HYDROCHLORIDE 0.5 MG: 1 INJECTION, SOLUTION INTRAMUSCULAR; INTRAVENOUS; SUBCUTANEOUS at 13:40

## 2023-05-08 RX ADMIN — MORPHINE SULFATE 2 MG: 2 INJECTION, SOLUTION INTRAMUSCULAR; INTRAVENOUS at 21:25

## 2023-05-08 RX ADMIN — SODIUM CHLORIDE, PRESERVATIVE FREE 10 ML: 5 INJECTION INTRAVENOUS at 21:01

## 2023-05-08 RX ADMIN — SODIUM CHLORIDE, POTASSIUM CHLORIDE, SODIUM LACTATE AND CALCIUM CHLORIDE 1000 ML: 600; 310; 30; 20 INJECTION, SOLUTION INTRAVENOUS at 13:15

## 2023-05-08 RX ADMIN — SODIUM CHLORIDE 100 ML: 9 INJECTION, SOLUTION INTRAVENOUS at 14:15

## 2023-05-08 RX ADMIN — IOPAMIDOL 100 ML: 755 INJECTION, SOLUTION INTRAVENOUS at 14:15

## 2023-05-08 RX ADMIN — HYOSCYAMINE SULFATE 250 MCG: 0.12 TABLET ORAL; SUBLINGUAL at 13:15

## 2023-05-08 RX ADMIN — SODIUM CHLORIDE, POTASSIUM CHLORIDE, SODIUM LACTATE AND CALCIUM CHLORIDE: 600; 310; 30; 20 INJECTION, SOLUTION INTRAVENOUS at 17:51

## 2023-05-08 RX ADMIN — MORPHINE SULFATE 2 MG: 2 INJECTION, SOLUTION INTRAMUSCULAR; INTRAVENOUS at 17:49

## 2023-05-08 ASSESSMENT — PAIN DESCRIPTION - DESCRIPTORS
DESCRIPTORS: SHARP
DESCRIPTORS: ACHING

## 2023-05-08 ASSESSMENT — LIFESTYLE VARIABLES
HOW OFTEN DO YOU HAVE A DRINK CONTAINING ALCOHOL: NEVER
HOW MANY STANDARD DRINKS CONTAINING ALCOHOL DO YOU HAVE ON A TYPICAL DAY: PATIENT DOES NOT DRINK

## 2023-05-08 ASSESSMENT — PAIN DESCRIPTION - ORIENTATION
ORIENTATION: RIGHT;LOWER;LEFT
ORIENTATION: MID

## 2023-05-08 ASSESSMENT — PAIN SCALES - GENERAL
PAINLEVEL_OUTOF10: 8
PAINLEVEL_OUTOF10: 6
PAINLEVEL_OUTOF10: 10
PAINLEVEL_OUTOF10: 10
PAINLEVEL_OUTOF10: 6

## 2023-05-08 ASSESSMENT — ENCOUNTER SYMPTOMS
NAUSEA: 1
BLOOD IN STOOL: 0
DIARRHEA: 0
ABDOMINAL PAIN: 1
RHINORRHEA: 0
COLOR CHANGE: 0
VOMITING: 1
CONSTIPATION: 0
BACK PAIN: 0

## 2023-05-08 ASSESSMENT — PAIN DESCRIPTION - LOCATION
LOCATION: ABDOMEN

## 2023-05-08 ASSESSMENT — PAIN - FUNCTIONAL ASSESSMENT
PAIN_FUNCTIONAL_ASSESSMENT: ACTIVITIES ARE NOT PREVENTED
PAIN_FUNCTIONAL_ASSESSMENT: 0-10

## 2023-05-08 ASSESSMENT — PAIN DESCRIPTION - PAIN TYPE: TYPE: ACUTE PAIN

## 2023-05-08 NOTE — H&P
Hospitalist History and Physical   Admit Date:  2023 12:04 PM   Name:  Michael Griggs   Age:  56 y.o. Sex:  female  :  1965   MRN:  [id number]   Room:  364/    Presenting/Chief Complaint: Abdominal Pain and Nausea     Reason(s) for Admission: Acute abdominal pain [R10.9]  Type 2 diabetes mellitus with other specified complication, with long-term current use of insulin (HCC) [E11.69, Z79.4]  Acute pancreatitis without infection or necrosis [K85.90]  Drug-induced acute pancreatitis, unspecified complication status [S56.71]     History of Present Illness:   Michael Griggs is a 56 y.o. female with medical history of diabetes, hypertension, obesity who presented with abdominal pain and found to have pancreatitis. She has had several recent episodes of pain over the past week that she thought was food poisoning. She had recently started taking empagliflozin and liraglutide. She is not tolerating the liraglutide and takes it intermittently. The pain is a deep ache and seems to go to her back as well. She is admitted for further evaluation and management. Imaging and labs personally reviewed. Discussed case with ED provider, patient, nurse, . Assessment & Plan:   Acute pancreatitis without infection or necrosis  Admission CT with swelling at pancreatic head, no necrosis.  Lipase ~1400  -hold empagliflozin  -NPO  -morphine  -aggressive fluid resuscitation    Type 2 diabetes mellitus with hyperglycemia, without long-term current use of insulin  -discontinue liraglutide for intolerance  -hold metformin  -basal 22U  -SSI    Moderate persistent asthma  -albuterol    Hypothyroidism  -levothyroxine      Primary hypertension  -amlodipine, HCTZ    Hyperlipidemia  -atorvastatin    Gastroesophageal reflux disease without esophagitis  -pantoprazole    Chronic recurrent major depressive disorder  -citalopram      Class 1 obesity due to excess calories with serious comorbidity and body mass index

## 2023-05-08 NOTE — PLAN OF CARE
Problem: Discharge Planning  Goal: Discharge to home or other facility with appropriate resources  Outcome: Progressing  Flowsheets  Taken 5/8/2023 1825  Discharge to home or other facility with appropriate resources: Identify barriers to discharge with patient and caregiver  Taken 5/8/2023 1731  Discharge to home or other facility with appropriate resources: Identify barriers to discharge with patient and caregiver     Problem: Pain  Goal: Verbalizes/displays adequate comfort level or baseline comfort level  Outcome: Progressing

## 2023-05-08 NOTE — H&P
Medical Student History & Physical   Admit Date:  2023 12:04 PM   Name:  Niurka Cavazos   Age:  56 y.o. Sex:  female  :  1965   MRN:  [id number]   Room:  [place] [address]    Presenting Complaint: Abdominal Pain and Nausea    Reason(s) for Admission: Acute pancreatitis without infection or necrosis [K85.90]     History of Present Illness:   Niurka Cavazos is a 56 y.o. female with medical history of type II diabetes, asthma, GERD, and hypertension who presented to the ED with abdominal pain. Last night, she ate left over chinese food from the night before consisting of rice and chicken. Approximately 1-2 hours after eating, she developed nausea and abdominal pain. This persisted overnight without vomiting. She awoke this morning and felt constipated, but states that after several minutes on the toilet \"the flood felton released\" and she had a large bowel movement. She continued to have abdominal pain after this without relief. She describes the pain as 8/10 and radiating to her right flank and back. She had a similar episode a few weeks ago after eating [employer] chicken. Last month, he primary care provider started her on Victoza and Jardiance for the management of her type II diabetes. ED workup included: lipase elevated 1419 U/L, CT of abdomen showing pancreatitis, non-contributing CBC, high sensitivty troponin < 3.0,     Surgical history: Tubal ligation. Specifically denies other abdominal surgery including cholecystectomy. Review of systems:  Gen: Denies fever. +weight loss (226 -> 198) on jardiance/victoza. HEENT: Headache associated with abdominal pain and feeling unwell  GI: +nausea, +abdominal pain radiating to right flank/back, +floating stools. Denies regurgitation, diarrhea after today's bowel movement, blood in stool, or black tarry stool  : +polyuria and polydypsia after starting jardiance  Derm: Denies new rash  Neuro: Denies focal neuro complaints.       Assessment & Plan:       Acute

## 2023-05-08 NOTE — ED NOTES
TRANSFER - OUT REPORT:    Verbal report given to Yen De La Paz on Hulon Handler  being transferred to [address] [place] [address] for routine progression of patient care       Report consisted of patient's Situation, Background, Assessment and   Recommendations(SBAR). Information from the following report(s) Nurse Handoff Report, ED SBAR, [facility], Recent Results, and Neuro Assessment was reviewed with the receiving nurse. Lines:   Peripheral IV 05/08/23 Left Antecubital (Active)        Opportunity for questions and clarification was provided.       Patient transported with:  Registered Nurse      Viktoriya Rasmussen RN  05/08/23 4550

## 2023-05-08 NOTE — ED TRIAGE NOTES
Pt ambulatory to triage.  States she ate chinese food  yesterday at about 11:30am and has had stomach pain with N/V/D since around 5pm.

## 2023-05-09 ENCOUNTER — APPOINTMENT (OUTPATIENT)
Dept: GENERAL RADIOLOGY | Age: 58
End: 2023-05-09
Payer: COMMERCIAL

## 2023-05-09 ENCOUNTER — APPOINTMENT (OUTPATIENT)
Dept: ULTRASOUND IMAGING | Age: 58
End: 2023-05-09
Payer: COMMERCIAL

## 2023-05-09 LAB
ALBUMIN SERPL-MCNC: 2.5 G/DL (ref 3.5–5)
ANION GAP SERPL CALC-SCNC: 6 MMOL/L (ref 2–11)
BUN SERPL-MCNC: 18 MG/DL (ref 6–23)
CALCIUM SERPL-MCNC: 8 MG/DL (ref 8.3–10.4)
CHLORIDE SERPL-SCNC: 104 MMOL/L (ref 101–110)
CO2 SERPL-SCNC: 25 MMOL/L (ref 21–32)
CREAT SERPL-MCNC: 1.01 MG/DL (ref 0.6–1)
ERYTHROCYTE [DISTWIDTH] IN BLOOD BY AUTOMATED COUNT: 14.1 % (ref 11.9–14.6)
EST. AVERAGE GLUCOSE BLD GHB EST-MCNC: 194 MG/DL
GLUCOSE BLD STRIP.AUTO-MCNC: 158 MG/DL (ref 65–100)
GLUCOSE BLD STRIP.AUTO-MCNC: 178 MG/DL (ref 65–100)
GLUCOSE BLD STRIP.AUTO-MCNC: 180 MG/DL (ref 65–100)
GLUCOSE SERPL-MCNC: 186 MG/DL (ref 65–100)
HBA1C MFR BLD: 8.4 % (ref 4.8–5.6)
HCT VFR BLD AUTO: 42.6 % (ref 35.8–46.3)
HGB BLD-MCNC: 13.7 G/DL (ref 11.7–15.4)
MCH RBC QN AUTO: 23.5 PG (ref 26.1–32.9)
MCHC RBC AUTO-ENTMCNC: 32.2 G/DL (ref 31.4–35)
MCV RBC AUTO: 73.2 FL (ref 82–102)
NRBC # BLD: 0 K/UL (ref 0–0.2)
PHOSPHATE SERPL-MCNC: 2.8 MG/DL (ref 2.5–4.5)
PLATELET # BLD AUTO: 338 K/UL (ref 150–450)
PMV BLD AUTO: 11 FL (ref 9.4–12.3)
POTASSIUM SERPL-SCNC: 3.7 MMOL/L (ref 3.5–5.1)
PROCALCITONIN SERPL-MCNC: 5.98 NG/ML (ref 0–0.49)
RBC # BLD AUTO: 5.82 M/UL (ref 4.05–5.2)
SERVICE CMNT-IMP: ABNORMAL
SODIUM SERPL-SCNC: 135 MMOL/L (ref 133–143)
TRIGL SERPL-MCNC: 77 MG/DL (ref 35–150)
TSH W FREE THYROID IF ABNORMAL: 1.8 UIU/ML (ref 0.36–3.74)
WBC # BLD AUTO: 8.8 K/UL (ref 4.3–11.1)

## 2023-05-09 PROCEDURE — 84145 PROCALCITONIN (PCT): CPT

## 2023-05-09 PROCEDURE — 36415 COLL VENOUS BLD VENIPUNCTURE: CPT

## 2023-05-09 PROCEDURE — 84478 ASSAY OF TRIGLYCERIDES: CPT

## 2023-05-09 PROCEDURE — 84100 ASSAY OF PHOSPHORUS: CPT

## 2023-05-09 PROCEDURE — 82040 ASSAY OF SERUM ALBUMIN: CPT

## 2023-05-09 PROCEDURE — 82962 GLUCOSE BLOOD TEST: CPT

## 2023-05-09 PROCEDURE — 83036 HEMOGLOBIN GLYCOSYLATED A1C: CPT

## 2023-05-09 PROCEDURE — 2580000003 HC RX 258: Performed by: FAMILY MEDICINE

## 2023-05-09 PROCEDURE — C9113 INJ PANTOPRAZOLE SODIUM, VIA: HCPCS | Performed by: FAMILY MEDICINE

## 2023-05-09 PROCEDURE — 84443 ASSAY THYROID STIM HORMONE: CPT

## 2023-05-09 PROCEDURE — A4216 STERILE WATER/SALINE, 10 ML: HCPCS | Performed by: FAMILY MEDICINE

## 2023-05-09 PROCEDURE — 74018 RADEX ABDOMEN 1 VIEW: CPT

## 2023-05-09 PROCEDURE — 6360000002 HC RX W HCPCS: Performed by: FAMILY MEDICINE

## 2023-05-09 PROCEDURE — 80048 BASIC METABOLIC PNL TOTAL CA: CPT

## 2023-05-09 PROCEDURE — 1100000000 HC RM PRIVATE

## 2023-05-09 PROCEDURE — 6370000000 HC RX 637 (ALT 250 FOR IP): Performed by: FAMILY MEDICINE

## 2023-05-09 PROCEDURE — 85027 COMPLETE CBC AUTOMATED: CPT

## 2023-05-09 PROCEDURE — 76700 US EXAM ABDOM COMPLETE: CPT

## 2023-05-09 RX ORDER — POLYETHYLENE GLYCOL 3350 17 G/17G
17 POWDER, FOR SOLUTION ORAL DAILY
Status: COMPLETED | OUTPATIENT
Start: 2023-05-09 | End: 2023-05-10

## 2023-05-09 RX ADMIN — ENOXAPARIN SODIUM 40 MG: 100 INJECTION SUBCUTANEOUS at 17:35

## 2023-05-09 RX ADMIN — MORPHINE SULFATE 4 MG: 4 INJECTION, SOLUTION INTRAMUSCULAR; INTRAVENOUS at 00:45

## 2023-05-09 RX ADMIN — LEVOTHYROXINE SODIUM 150 MCG: 75 TABLET ORAL at 05:31

## 2023-05-09 RX ADMIN — MORPHINE SULFATE 4 MG: 4 INJECTION, SOLUTION INTRAMUSCULAR; INTRAVENOUS at 11:48

## 2023-05-09 RX ADMIN — INSULIN GLARGINE 22 UNITS: 100 INJECTION, SOLUTION SUBCUTANEOUS at 21:09

## 2023-05-09 RX ADMIN — POLYETHYLENE GLYCOL 3350 17 G: 17 POWDER, FOR SOLUTION ORAL at 11:46

## 2023-05-09 RX ADMIN — MORPHINE SULFATE 2 MG: 2 INJECTION, SOLUTION INTRAMUSCULAR; INTRAVENOUS at 05:31

## 2023-05-09 RX ADMIN — MORPHINE SULFATE 4 MG: 4 INJECTION, SOLUTION INTRAMUSCULAR; INTRAVENOUS at 08:20

## 2023-05-09 RX ADMIN — ATORVASTATIN CALCIUM 10 MG: 10 TABLET, FILM COATED ORAL at 08:20

## 2023-05-09 RX ADMIN — HYDROCHLOROTHIAZIDE 25 MG: 25 TABLET ORAL at 08:19

## 2023-05-09 RX ADMIN — MORPHINE SULFATE 2 MG: 2 INJECTION, SOLUTION INTRAMUSCULAR; INTRAVENOUS at 20:58

## 2023-05-09 RX ADMIN — SODIUM CHLORIDE, POTASSIUM CHLORIDE, SODIUM LACTATE AND CALCIUM CHLORIDE: 600; 310; 30; 20 INJECTION, SOLUTION INTRAVENOUS at 21:07

## 2023-05-09 RX ADMIN — NALOXEGOL OXALATE 12.5 MG: 25 TABLET, FILM COATED ORAL at 11:47

## 2023-05-09 RX ADMIN — SODIUM CHLORIDE, PRESERVATIVE FREE 10 ML: 5 INJECTION INTRAVENOUS at 08:22

## 2023-05-09 RX ADMIN — SODIUM CHLORIDE 40 MG: 9 INJECTION INTRAMUSCULAR; INTRAVENOUS; SUBCUTANEOUS at 08:20

## 2023-05-09 RX ADMIN — SODIUM CHLORIDE, POTASSIUM CHLORIDE, SODIUM LACTATE AND CALCIUM CHLORIDE: 600; 310; 30; 20 INJECTION, SOLUTION INTRAVENOUS at 02:29

## 2023-05-09 RX ADMIN — CITALOPRAM HYDROBROMIDE 20 MG: 20 TABLET ORAL at 08:20

## 2023-05-09 RX ADMIN — SODIUM CHLORIDE, POTASSIUM CHLORIDE, SODIUM LACTATE AND CALCIUM CHLORIDE: 600; 310; 30; 20 INJECTION, SOLUTION INTRAVENOUS at 08:23

## 2023-05-09 RX ADMIN — MORPHINE SULFATE 4 MG: 4 INJECTION, SOLUTION INTRAMUSCULAR; INTRAVENOUS at 17:36

## 2023-05-09 RX ADMIN — SODIUM CHLORIDE, POTASSIUM CHLORIDE, SODIUM LACTATE AND CALCIUM CHLORIDE: 600; 310; 30; 20 INJECTION, SOLUTION INTRAVENOUS at 06:16

## 2023-05-09 RX ADMIN — AMLODIPINE BESYLATE 10 MG: 10 TABLET ORAL at 08:19

## 2023-05-09 ASSESSMENT — PAIN SCALES - GENERAL
PAINLEVEL_OUTOF10: 6
PAINLEVEL_OUTOF10: 0
PAINLEVEL_OUTOF10: 7
PAINLEVEL_OUTOF10: 8
PAINLEVEL_OUTOF10: 8
PAINLEVEL_OUTOF10: 7
PAINLEVEL_OUTOF10: 9

## 2023-05-09 ASSESSMENT — PAIN DESCRIPTION - LOCATION
LOCATION: ABDOMEN

## 2023-05-09 ASSESSMENT — PAIN DESCRIPTION - ORIENTATION
ORIENTATION: MID
ORIENTATION: MID

## 2023-05-09 ASSESSMENT — PAIN DESCRIPTION - DESCRIPTORS
DESCRIPTORS: ACHING

## 2023-05-09 NOTE — PLAN OF CARE
Problem: Discharge Planning  Goal: Discharge to home or other facility with appropriate resources  5/9/2023 0826 by Clarice Muniz RN  Outcome: Progressing  5/8/2023 2313 by Hoda Rodriguez RN  Outcome: Progressing  Flowsheets (Taken 5/8/2023 2000)  Discharge to home or other facility with appropriate resources: Identify barriers to discharge with patient and caregiver  5/8/2023 1827 by Troy Harden RN  Outcome: Progressing  Flowsheets  Taken 5/8/2023 1825  Discharge to home or other facility with appropriate resources: Identify barriers to discharge with patient and caregiver  Taken 5/8/2023 1731  Discharge to home or other facility with appropriate resources: Identify barriers to discharge with patient and caregiver     Problem: Pain  Goal: Verbalizes/displays adequate comfort level or baseline comfort level  5/9/2023 0826 by Clarice Muniz RN  Outcome: Progressing  5/8/2023 2313 by Hoda Rodriguez RN  Outcome: Progressing  5/8/2023 1827 by Troy Harden RN  Outcome: Progressing

## 2023-05-09 NOTE — PLAN OF CARE
Problem: Discharge Planning  Goal: Discharge to home or other facility with appropriate resources  5/8/2023 2313 by Hoda Rodriguez RN  Outcome: Progressing  5/8/2023 1827 by Troy Harden RN  Outcome: Progressing  Flowsheets  Taken 5/8/2023 1825  Discharge to home or other facility with appropriate resources: Identify barriers to discharge with patient and caregiver  Taken 5/8/2023 1731  Discharge to home or other facility with appropriate resources: Identify barriers to discharge with patient and caregiver     Problem: Pain  Goal: Verbalizes/displays adequate comfort level or baseline comfort level  5/8/2023 2313 by Hoda Rodriguez RN  Outcome: Progressing  5/8/2023 1827 by Troy Harden RN  Outcome: Progressing

## 2023-05-09 NOTE — CARE COORDINATION
CM spoke w/ patient at bedside for discharge planning. Patient lives in home w/ her son. She is independent w/ ADLs and is currently employed. No DME in the home. Patient states she needs assistance finding PCP. She is agreeable to referral to [facility]. Referral placed; patient verbalized understanding that physician services will contact her after discharge to arrange new pt appt. No further discharge needs at this time. CM will remain available. 05/09/23 2652   Service Assessment   Patient Orientation Alert and Oriented   Cognition Alert   History Provided By Patient   Primary Caregiver Self   Support Systems Family Members; Children   PCP Verified by CM No  (No PCP)   Prior Functional Level Independent in ADLs/IADLs   Current Functional Level Independent in ADLs/IADLs   Can patient return to prior living arrangement Yes   Ability to make needs known: Good   Family able to assist with home care needs: Yes   Would you like for me to discuss the discharge plan with any other family members/significant others, and if so, who? No   Social/Functional History   Lives With Son   Type of Home House   Discharge Planning   Type of [place] [address] Discharge   Transition of [address] ([address] Other   Condition of Participation: Discharge [address] for Transition of Care is related to the following treatment goals: Return to home.

## 2023-05-10 LAB
ALBUMIN SERPL-MCNC: 2.3 G/DL (ref 3.5–5)
ANION GAP SERPL CALC-SCNC: 9 MMOL/L (ref 2–11)
BUN SERPL-MCNC: 22 MG/DL (ref 6–23)
CALCIUM SERPL-MCNC: 8.8 MG/DL (ref 8.3–10.4)
CHLORIDE SERPL-SCNC: 103 MMOL/L (ref 101–110)
CO2 SERPL-SCNC: 25 MMOL/L (ref 21–32)
CREAT SERPL-MCNC: 0.76 MG/DL (ref 0.6–1)
ERYTHROCYTE [DISTWIDTH] IN BLOOD BY AUTOMATED COUNT: 14 % (ref 11.9–14.6)
GLUCOSE BLD STRIP.AUTO-MCNC: 104 MG/DL (ref 65–100)
GLUCOSE BLD STRIP.AUTO-MCNC: 112 MG/DL (ref 65–100)
GLUCOSE BLD STRIP.AUTO-MCNC: 114 MG/DL (ref 65–100)
GLUCOSE BLD STRIP.AUTO-MCNC: 116 MG/DL (ref 65–100)
GLUCOSE SERPL-MCNC: 130 MG/DL (ref 65–100)
HCT VFR BLD AUTO: 35 % (ref 35.8–46.3)
HGB BLD-MCNC: 11.1 G/DL (ref 11.7–15.4)
MCH RBC QN AUTO: 23.3 PG (ref 26.1–32.9)
MCHC RBC AUTO-ENTMCNC: 31.7 G/DL (ref 31.4–35)
MCV RBC AUTO: 73.4 FL (ref 82–102)
NRBC # BLD: 0 K/UL (ref 0–0.2)
PHOSPHATE SERPL-MCNC: 2.3 MG/DL (ref 2.5–4.5)
PLATELET # BLD AUTO: 311 K/UL (ref 150–450)
PMV BLD AUTO: 11 FL (ref 9.4–12.3)
POTASSIUM SERPL-SCNC: 3.8 MMOL/L (ref 3.5–5.1)
PROCALCITONIN SERPL-MCNC: 4.74 NG/ML (ref 0–0.49)
RBC # BLD AUTO: 4.77 M/UL (ref 4.05–5.2)
SERVICE CMNT-IMP: ABNORMAL
SODIUM SERPL-SCNC: 137 MMOL/L (ref 133–143)
WBC # BLD AUTO: 9.3 K/UL (ref 4.3–11.1)

## 2023-05-10 PROCEDURE — A4216 STERILE WATER/SALINE, 10 ML: HCPCS | Performed by: FAMILY MEDICINE

## 2023-05-10 PROCEDURE — 82040 ASSAY OF SERUM ALBUMIN: CPT

## 2023-05-10 PROCEDURE — 84100 ASSAY OF PHOSPHORUS: CPT

## 2023-05-10 PROCEDURE — 36415 COLL VENOUS BLD VENIPUNCTURE: CPT

## 2023-05-10 PROCEDURE — 84145 PROCALCITONIN (PCT): CPT

## 2023-05-10 PROCEDURE — 6370000000 HC RX 637 (ALT 250 FOR IP): Performed by: FAMILY MEDICINE

## 2023-05-10 PROCEDURE — 82962 GLUCOSE BLOOD TEST: CPT

## 2023-05-10 PROCEDURE — 80048 BASIC METABOLIC PNL TOTAL CA: CPT

## 2023-05-10 PROCEDURE — 2580000003 HC RX 258: Performed by: FAMILY MEDICINE

## 2023-05-10 PROCEDURE — 85027 COMPLETE CBC AUTOMATED: CPT

## 2023-05-10 PROCEDURE — 1100000000 HC RM PRIVATE

## 2023-05-10 PROCEDURE — C9113 INJ PANTOPRAZOLE SODIUM, VIA: HCPCS | Performed by: FAMILY MEDICINE

## 2023-05-10 PROCEDURE — 6360000002 HC RX W HCPCS: Performed by: FAMILY MEDICINE

## 2023-05-10 RX ADMIN — Medication 240 ML: at 13:44

## 2023-05-10 RX ADMIN — MORPHINE SULFATE 2 MG: 2 INJECTION, SOLUTION INTRAMUSCULAR; INTRAVENOUS at 12:20

## 2023-05-10 RX ADMIN — SODIUM CHLORIDE, POTASSIUM CHLORIDE, SODIUM LACTATE AND CALCIUM CHLORIDE: 600; 310; 30; 20 INJECTION, SOLUTION INTRAVENOUS at 19:45

## 2023-05-10 RX ADMIN — ENOXAPARIN SODIUM 40 MG: 100 INJECTION SUBCUTANEOUS at 16:57

## 2023-05-10 RX ADMIN — NALOXEGOL OXALATE 12.5 MG: 25 TABLET, FILM COATED ORAL at 05:05

## 2023-05-10 RX ADMIN — LEVOTHYROXINE SODIUM 150 MCG: 75 TABLET ORAL at 05:05

## 2023-05-10 RX ADMIN — MORPHINE SULFATE 2 MG: 2 INJECTION, SOLUTION INTRAMUSCULAR; INTRAVENOUS at 19:52

## 2023-05-10 RX ADMIN — MORPHINE SULFATE 2 MG: 2 INJECTION, SOLUTION INTRAMUSCULAR; INTRAVENOUS at 06:11

## 2023-05-10 RX ADMIN — SODIUM CHLORIDE, POTASSIUM CHLORIDE, SODIUM LACTATE AND CALCIUM CHLORIDE: 600; 310; 30; 20 INJECTION, SOLUTION INTRAVENOUS at 11:35

## 2023-05-10 RX ADMIN — SODIUM CHLORIDE, PRESERVATIVE FREE 10 ML: 5 INJECTION INTRAVENOUS at 08:48

## 2023-05-10 RX ADMIN — CITALOPRAM HYDROBROMIDE 20 MG: 20 TABLET ORAL at 08:48

## 2023-05-10 RX ADMIN — AMLODIPINE BESYLATE 10 MG: 10 TABLET ORAL at 08:48

## 2023-05-10 RX ADMIN — POLYETHYLENE GLYCOL 3350 17 G: 17 POWDER, FOR SOLUTION ORAL at 08:46

## 2023-05-10 RX ADMIN — ATORVASTATIN CALCIUM 10 MG: 10 TABLET, FILM COATED ORAL at 08:48

## 2023-05-10 RX ADMIN — SODIUM CHLORIDE 40 MG: 9 INJECTION INTRAMUSCULAR; INTRAVENOUS; SUBCUTANEOUS at 08:47

## 2023-05-10 RX ADMIN — HYDROCHLOROTHIAZIDE 25 MG: 25 TABLET ORAL at 08:48

## 2023-05-10 ASSESSMENT — PAIN DESCRIPTION - DESCRIPTORS
DESCRIPTORS: ACHING
DESCRIPTORS: ACHING

## 2023-05-10 ASSESSMENT — PAIN DESCRIPTION - LOCATION
LOCATION: ABDOMEN

## 2023-05-10 ASSESSMENT — PAIN SCALES - GENERAL
PAINLEVEL_OUTOF10: 5
PAINLEVEL_OUTOF10: 5
PAINLEVEL_OUTOF10: 6
PAINLEVEL_OUTOF10: 6

## 2023-05-10 NOTE — CARE COORDINATION
56: RN CM and the attending MD attempted to meet with the patient to review her discharge plan. The primary team was at the bedside providing patient care. 1340: RN CM met with the patient at the bedside and reviewed her discharge plan. She confirmed she plans on returning home with her son. She is independent of ADLs and does not use DMEs or external services. She confirmed she has transportation home. No case management needs were identified. RN CM encouraged her to ask questions. She verbalized understanding and agreement with the discharge plan.

## 2023-05-10 NOTE — CARE COORDINATION
Discharge planning was discussed with the Interdisciplinary Team. The patient is a potential discharge home tomorrow. No case management needs are anticipated.

## 2023-05-11 LAB
ALBUMIN SERPL-MCNC: 2.3 G/DL (ref 3.5–5)
ANION GAP SERPL CALC-SCNC: 7 MMOL/L (ref 2–11)
BUN SERPL-MCNC: 13 MG/DL (ref 6–23)
CALCIUM SERPL-MCNC: 8.9 MG/DL (ref 8.3–10.4)
CHLORIDE SERPL-SCNC: 105 MMOL/L (ref 101–110)
CO2 SERPL-SCNC: 26 MMOL/L (ref 21–32)
CREAT SERPL-MCNC: 0.57 MG/DL (ref 0.6–1)
ERYTHROCYTE [DISTWIDTH] IN BLOOD BY AUTOMATED COUNT: 14 % (ref 11.9–14.6)
GLUCOSE BLD STRIP.AUTO-MCNC: 104 MG/DL (ref 65–100)
GLUCOSE BLD STRIP.AUTO-MCNC: 120 MG/DL (ref 65–100)
GLUCOSE BLD STRIP.AUTO-MCNC: 139 MG/DL (ref 65–100)
GLUCOSE BLD STRIP.AUTO-MCNC: 140 MG/DL (ref 65–100)
GLUCOSE SERPL-MCNC: 119 MG/DL (ref 65–100)
HCT VFR BLD AUTO: 30 % (ref 35.8–46.3)
HGB BLD-MCNC: 9.7 G/DL (ref 11.7–15.4)
MAGNESIUM SERPL-MCNC: 2.3 MG/DL (ref 1.8–2.4)
MCH RBC QN AUTO: 23.5 PG (ref 26.1–32.9)
MCHC RBC AUTO-ENTMCNC: 32.3 G/DL (ref 31.4–35)
MCV RBC AUTO: 72.6 FL (ref 82–102)
NRBC # BLD: 0 K/UL (ref 0–0.2)
PHOSPHATE SERPL-MCNC: 2.5 MG/DL (ref 2.5–4.5)
PLATELET # BLD AUTO: 278 K/UL (ref 150–450)
PMV BLD AUTO: 10.9 FL (ref 9.4–12.3)
POTASSIUM SERPL-SCNC: 3.3 MMOL/L (ref 3.5–5.1)
PROCALCITONIN SERPL-MCNC: 2.9 NG/ML (ref 0–0.49)
RBC # BLD AUTO: 4.13 M/UL (ref 4.05–5.2)
SERVICE CMNT-IMP: ABNORMAL
SODIUM SERPL-SCNC: 138 MMOL/L (ref 133–143)
WBC # BLD AUTO: 8.9 K/UL (ref 4.3–11.1)

## 2023-05-11 PROCEDURE — A4216 STERILE WATER/SALINE, 10 ML: HCPCS | Performed by: FAMILY MEDICINE

## 2023-05-11 PROCEDURE — 6360000002 HC RX W HCPCS: Performed by: FAMILY MEDICINE

## 2023-05-11 PROCEDURE — 97535 SELF CARE MNGMENT TRAINING: CPT

## 2023-05-11 PROCEDURE — 97530 THERAPEUTIC ACTIVITIES: CPT

## 2023-05-11 PROCEDURE — C9113 INJ PANTOPRAZOLE SODIUM, VIA: HCPCS | Performed by: FAMILY MEDICINE

## 2023-05-11 PROCEDURE — 83735 ASSAY OF MAGNESIUM: CPT

## 2023-05-11 PROCEDURE — 1100000000 HC RM PRIVATE

## 2023-05-11 PROCEDURE — 2580000003 HC RX 258: Performed by: FAMILY MEDICINE

## 2023-05-11 PROCEDURE — 84145 PROCALCITONIN (PCT): CPT

## 2023-05-11 PROCEDURE — 85027 COMPLETE CBC AUTOMATED: CPT

## 2023-05-11 PROCEDURE — 82962 GLUCOSE BLOOD TEST: CPT

## 2023-05-11 PROCEDURE — 82040 ASSAY OF SERUM ALBUMIN: CPT

## 2023-05-11 PROCEDURE — 84100 ASSAY OF PHOSPHORUS: CPT

## 2023-05-11 PROCEDURE — 36415 COLL VENOUS BLD VENIPUNCTURE: CPT

## 2023-05-11 PROCEDURE — 97161 PT EVAL LOW COMPLEX 20 MIN: CPT

## 2023-05-11 PROCEDURE — 97165 OT EVAL LOW COMPLEX 30 MIN: CPT

## 2023-05-11 PROCEDURE — 80048 BASIC METABOLIC PNL TOTAL CA: CPT

## 2023-05-11 PROCEDURE — 6370000000 HC RX 637 (ALT 250 FOR IP): Performed by: FAMILY MEDICINE

## 2023-05-11 RX ORDER — TRAMADOL HYDROCHLORIDE 50 MG/1
25 TABLET ORAL EVERY 6 HOURS PRN
Status: DISCONTINUED | OUTPATIENT
Start: 2023-05-11 | End: 2023-05-12 | Stop reason: HOSPADM

## 2023-05-11 RX ORDER — TRAMADOL HYDROCHLORIDE 50 MG/1
50 TABLET ORAL EVERY 6 HOURS PRN
Status: DISCONTINUED | OUTPATIENT
Start: 2023-05-11 | End: 2023-05-12 | Stop reason: HOSPADM

## 2023-05-11 RX ADMIN — CITALOPRAM HYDROBROMIDE 20 MG: 20 TABLET ORAL at 08:07

## 2023-05-11 RX ADMIN — TRAMADOL HYDROCHLORIDE 25 MG: 50 TABLET ORAL at 15:04

## 2023-05-11 RX ADMIN — HYDROCHLOROTHIAZIDE 25 MG: 25 TABLET ORAL at 08:07

## 2023-05-11 RX ADMIN — ATORVASTATIN CALCIUM 10 MG: 10 TABLET, FILM COATED ORAL at 08:08

## 2023-05-11 RX ADMIN — LEVOTHYROXINE SODIUM 150 MCG: 75 TABLET ORAL at 05:09

## 2023-05-11 RX ADMIN — ENOXAPARIN SODIUM 40 MG: 100 INJECTION SUBCUTANEOUS at 17:41

## 2023-05-11 RX ADMIN — MORPHINE SULFATE 2 MG: 2 INJECTION, SOLUTION INTRAMUSCULAR; INTRAVENOUS at 08:27

## 2023-05-11 RX ADMIN — AMLODIPINE BESYLATE 10 MG: 10 TABLET ORAL at 08:08

## 2023-05-11 RX ADMIN — NALOXEGOL OXALATE 12.5 MG: 25 TABLET, FILM COATED ORAL at 05:09

## 2023-05-11 RX ADMIN — SODIUM CHLORIDE, PRESERVATIVE FREE 10 ML: 5 INJECTION INTRAVENOUS at 19:07

## 2023-05-11 RX ADMIN — SODIUM CHLORIDE 40 MG: 9 INJECTION INTRAMUSCULAR; INTRAVENOUS; SUBCUTANEOUS at 08:06

## 2023-05-11 RX ADMIN — SODIUM CHLORIDE, PRESERVATIVE FREE 10 ML: 5 INJECTION INTRAVENOUS at 08:08

## 2023-05-11 RX ADMIN — POTASSIUM CHLORIDE 40 MEQ: 1500 TABLET, EXTENDED RELEASE ORAL at 08:08

## 2023-05-11 RX ADMIN — SODIUM CHLORIDE, POTASSIUM CHLORIDE, SODIUM LACTATE AND CALCIUM CHLORIDE: 600; 310; 30; 20 INJECTION, SOLUTION INTRAVENOUS at 09:17

## 2023-05-11 RX ADMIN — MORPHINE SULFATE 2 MG: 2 INJECTION, SOLUTION INTRAMUSCULAR; INTRAVENOUS at 03:13

## 2023-05-11 RX ADMIN — TRAMADOL HYDROCHLORIDE 50 MG: 50 TABLET ORAL at 22:23

## 2023-05-11 ASSESSMENT — PAIN SCALES - GENERAL
PAINLEVEL_OUTOF10: 5
PAINLEVEL_OUTOF10: 7
PAINLEVEL_OUTOF10: 5
PAINLEVEL_OUTOF10: 0
PAINLEVEL_OUTOF10: 5

## 2023-05-11 ASSESSMENT — PAIN DESCRIPTION - DESCRIPTORS
DESCRIPTORS: ACHING
DESCRIPTORS: ACHING

## 2023-05-11 ASSESSMENT — PAIN DESCRIPTION - LOCATION
LOCATION: ABDOMEN

## 2023-05-12 VITALS
SYSTOLIC BLOOD PRESSURE: 128 MMHG | OXYGEN SATURATION: 96 % | HEART RATE: 100 BPM | WEIGHT: 198 LBS | RESPIRATION RATE: 16 BRPM | BODY MASS INDEX: 31.82 KG/M2 | DIASTOLIC BLOOD PRESSURE: 62 MMHG | HEIGHT: 66 IN | TEMPERATURE: 98.2 F

## 2023-05-12 PROBLEM — K85.90 ACUTE PANCREATITIS WITHOUT INFECTION OR NECROSIS: Status: RESOLVED | Noted: 2023-05-08 | Resolved: 2023-05-12

## 2023-05-12 LAB
25(OH)D3 SERPL-MCNC: 26.4 NG/ML (ref 30–100)
FERRITIN SERPL-MCNC: 336 NG/ML (ref 8–388)
FOLATE SERPL-MCNC: 10.2 NG/ML (ref 3.1–17.5)
GLUCOSE BLD STRIP.AUTO-MCNC: 120 MG/DL (ref 65–100)
HGB RETIC QN AUTO: 24 PG (ref 29–35)
IMM RETICS NFR: 33.1 % (ref 3–15.9)
IRON SATN MFR SERPL: 6 %
IRON SERPL-MCNC: 13 UG/DL (ref 35–150)
RETICS # AUTO: 0.04 M/UL (ref 0.03–0.1)
RETICS/RBC NFR AUTO: 1 % (ref 0.3–2)
SERVICE CMNT-IMP: ABNORMAL
TIBC SERPL-MCNC: 208 UG/DL (ref 250–450)
VIT B12 SERPL-MCNC: 4184 PG/ML (ref 193–986)

## 2023-05-12 PROCEDURE — 82728 ASSAY OF FERRITIN: CPT

## 2023-05-12 PROCEDURE — 82746 ASSAY OF FOLIC ACID SERUM: CPT

## 2023-05-12 PROCEDURE — 83550 IRON BINDING TEST: CPT

## 2023-05-12 PROCEDURE — 36415 COLL VENOUS BLD VENIPUNCTURE: CPT

## 2023-05-12 PROCEDURE — 82962 GLUCOSE BLOOD TEST: CPT

## 2023-05-12 PROCEDURE — 2580000003 HC RX 258: Performed by: FAMILY MEDICINE

## 2023-05-12 PROCEDURE — 83540 ASSAY OF IRON: CPT

## 2023-05-12 PROCEDURE — 82607 VITAMIN B-12: CPT

## 2023-05-12 PROCEDURE — 85046 RETICYTE/HGB CONCENTRATE: CPT

## 2023-05-12 PROCEDURE — 84238 ASSAY NONENDOCRINE RECEPTOR: CPT

## 2023-05-12 PROCEDURE — 82306 VITAMIN D 25 HYDROXY: CPT

## 2023-05-12 PROCEDURE — 6370000000 HC RX 637 (ALT 250 FOR IP): Performed by: FAMILY MEDICINE

## 2023-05-12 RX ORDER — PANTOPRAZOLE SODIUM 40 MG/1
40 TABLET, DELAYED RELEASE ORAL
Status: DISCONTINUED | OUTPATIENT
Start: 2023-05-12 | End: 2023-05-12 | Stop reason: HOSPADM

## 2023-05-12 RX ADMIN — AMLODIPINE BESYLATE 10 MG: 10 TABLET ORAL at 08:47

## 2023-05-12 RX ADMIN — TRAMADOL HYDROCHLORIDE 25 MG: 50 TABLET ORAL at 08:53

## 2023-05-12 RX ADMIN — CITALOPRAM HYDROBROMIDE 20 MG: 20 TABLET ORAL at 08:47

## 2023-05-12 RX ADMIN — PANTOPRAZOLE SODIUM 40 MG: 40 TABLET, DELAYED RELEASE ORAL at 08:47

## 2023-05-12 RX ADMIN — ATORVASTATIN CALCIUM 10 MG: 10 TABLET, FILM COATED ORAL at 08:47

## 2023-05-12 RX ADMIN — HYDROCHLOROTHIAZIDE 25 MG: 25 TABLET ORAL at 08:47

## 2023-05-12 RX ADMIN — SODIUM CHLORIDE, PRESERVATIVE FREE 10 ML: 5 INJECTION INTRAVENOUS at 08:53

## 2023-05-12 RX ADMIN — NALOXEGOL OXALATE 12.5 MG: 25 TABLET, FILM COATED ORAL at 05:17

## 2023-05-12 RX ADMIN — LEVOTHYROXINE SODIUM 150 MCG: 75 TABLET ORAL at 05:17

## 2023-05-12 ASSESSMENT — PAIN SCALES - GENERAL: PAINLEVEL_OUTOF10: 4

## 2023-05-12 ASSESSMENT — PAIN DESCRIPTION - LOCATION: LOCATION: ABDOMEN

## 2023-05-12 NOTE — DISCHARGE SUMMARY
05/11/23  4:50 PM   Result Value Ref Range    POC Glucose 140 (H) 65 - 100 mg/dL    Performed by: Reece    POCT Glucose    Collection Time: 05/11/23  7:43 PM   Result Value Ref Range    POC Glucose 139 (H) 65 - 100 mg/dL    Performed by: Gavi Franco    Transferrin Saturation    Collection Time: 05/12/23  5:32 AM   Result Value Ref Range    Iron 13 (L) 35 - 150 ug/dL    TIBC 208 (L) 250 - 450 ug/dL    TRANSFERRIN SATURATION 6 %   Ferritin    Collection Time: 05/12/23  5:32 AM   Result Value Ref Range    Ferritin 336 8 - 388 NG/ML   Reticulocytes    Collection Time: 05/12/23  5:32 AM   Result Value Ref Range    Reticulocyte Count,Automated 1.0 0.3 - 2.0 %    Absolute Retic # 0.0426 0.026 - 0.095 M/ul    Immature Retic Fraction 33.1 (H) 3.0 - 15.9 %    Retic Hemoglobin conc. 24 (L) 29 - 35 pg   POCT Glucose    Collection Time: 05/12/23  7:25 AM   Result Value Ref Range    POC Glucose 120 (H) 65 - 100 mg/dL    Performed by: Darwin Barrientos        Allergies   Allergen Reactions    Latex Anaphylaxis    Kiwi Extract Shortness Of Breath    [place] Flavor Shortness Of Breath    Shellfish Allergy Anaphylaxis    Yellow Dye Shortness Of Breath    Losartan Cough    Empagliflozin Other (See Comments)     pancreatitis     Immunization History   Administered Date(s) Administered    COVID-19, PFIZER PURPLE top, DILUTE for use, (age 15 y+), 30mcg/0.3mL 07/25/2021, 08/16/2021    Influenza Virus Vaccine 09/20/2018    Tetanus Immune Globulin 01/01/2010       Recent Vital Data:  Patient Vitals for the past 24 hrs:   Temp Pulse Resp BP SpO2   05/12/23 0727 98.2 °F (36.8 °C) 100 16 128/62 96 %   05/11/23 2253 -- -- 18 -- --   05/11/23 1945 99.3 °F (37.4 °C) (!) 109 18 (!) 155/78 98 %   05/11/23 1534 -- -- 18 -- --       Oxygen Therapy  SpO2: 96 %  Pulse via Oximetry: 112 beats per minute  O2 Device: None (Room air)    Estimated body mass index is 31.96 kg/m² as calculated from the following:    Height as of this encounter:

## 2023-05-12 NOTE — PROGRESS NOTES
ACUTE PHYSICAL THERAPY GOALS:   (Developed with and agreed upon by patient and/or caregiver.)  Pt. Will transfer and ambulate 650 feet SBA/supervision in one visit - met     PHYSICAL THERAPY Initial Assessment, Discharge, and PM  (Link to Caseload Tracking: PT Visit Days : 1  Acknowledge Orders  Time In/Out  PT Charge Capture  Rehab Caseload Tracker    Marinell Severance is a 56 y.o. female   PRIMARY DIAGNOSIS: Acute pancreatitis without infection or necrosis  Acute abdominal pain [R10.9]  Type 2 diabetes mellitus with other specified complication, with long-term current use of insulin (HCC) [E11.69, Z79.4]  Acute pancreatitis without infection or necrosis [K85.90]  Drug-induced acute pancreatitis, unspecified complication status [C79.33]       Reason for Referral: Generalized Muscle Weakness (M62.81)  Other abnormalities of gait and mobility (R26.89)  Inpatient: Payor: Lui Kiser 150 / Plan: Lavenia Overcast / Product Type: *No Product type* /     ASSESSMENT:     REHAB RECOMMENDATIONS:   Recommendation to date pending progress:  Setting:  No further skilled therapy after discharge from hospital    Equipment:    None     ASSESSMENT:  Ms. Zacarias Villalobos presents with decreased functional mobility admitted with above diagnosis. She is normally independent and works and lives with her 2 sons. This afternoon, she reports feeling a little better. Some abdominal pain still  Her daughters are present and supportive. She reports she has already walked with them but will walk again. She transferred out of bed with a little family assist.  She stood and ambulated SBA/supervision and ambulated a full lap in the hospital and did some steps. Gait is slower but she did fine. O2 sats 96% with gait, mild SOB. Spoke with patient. She said she will get up and ambulate with family and staff and did not need PT checking on her. I encouraged her to move more with staff. Will d/c.      [facility] Inpatient Short
Discharge instructions reviewed with Pt. Opportunity for questions and clarification given. IV removed and cath tip intact. Scripts sent to pharmacy / sent with pt. Education given to pt and pt was able to teach back. No needs at this time and pt waiting on ride to arrive.
HCPOA form given to patient. Prayer and support given also. Saul Esparza M.Div.   
Hospitalist Progress Note   Admit Date:  2023 12:04 PM   Name:  Benson Zavala   Age:  56 y.o. Sex:  female  :  1965   MRN:  [id number]   Room:  364/01    Presenting/Chief Complaint: Abdominal Pain and Nausea     Reason(s) for Admission: Acute abdominal pain [R10.9]  Type 2 diabetes mellitus with other specified complication, with long-term current use of insulin (HCC) [E11.69, Z79.4]  Acute pancreatitis without infection or necrosis [K85.90]  Drug-induced acute pancreatitis, unspecified complication status [C09.35]     Hospital Course:   56 y.o. female with medical history of diabetes, hypertension, obesity who presented with abdominal pain and found to have pancreatitis. She has had several recent episodes of pain over the past week that she thought was food poisoning. She had recently started taking empagliflozin and liraglutide. She is not tolerating the liraglutide and takes it intermittently. The pain is a deep ache and seems to go to her back as well. She is admitted for further evaluation and management. Subjective & 24hr Events (23): No BM since admission. Lower abdominal fullness. KUB. Add US Abd complete. Add movantik, PEG. Decreased fluid rate. Pain remains significant, anxious about taking morphine. Will consider advance diet with improved pain control. Imaging and labs personally reviewed. Discussed case with patient, nurse, . Assessment & Plan:   Acute pancreatitis without infection or necrosis  Admission CT with swelling at pancreatic head, no necrosis.  Lipase ~1400  -hold empagliflozin  -NPO, advance as tolerated  -morphine  -slowed fluid resuscitation  2023: add movantik, PEG for no BM abd pain, KUB, US Abd     Type 2 diabetes mellitus with hyperglycemia, without long-term current use of insulin  -discontinue liraglutide for intolerance  -hold metformin  -basal 22U  -SSI  2023: no SSI required today, continue basal 22U     Moderate persistent
Hospitalist Progress Note   Admit Date:  2023 12:04 PM   Name:  Eve Gonzalez   Age:  56 y.o. Sex:  female  :  1965   MRN:  [id number]   Room:  Critical access hospital/    Presenting/Chief Complaint: Abdominal Pain and Nausea     Reason(s) for Admission: Acute abdominal pain [R10.9]  Type 2 diabetes mellitus with other specified complication, with long-term current use of insulin (HCC) [E11.69, Z79.4]  Acute pancreatitis without infection or necrosis [K85.90]  Drug-induced acute pancreatitis, unspecified complication status [P93.10]     Hospital Course:   56 y.o. female with medical history of diabetes, hypertension, obesity who presented with abdominal pain and found to have pancreatitis. She has had several recent episodes of pain over the past week that she thought was food poisoning. She had recently started taking empagliflozin and liraglutide. She is not tolerating the liraglutide and takes it intermittently. The pain is a deep ache and seems to go to her back as well. She is admitted for further evaluation and management. Subjective & 24hr Events (23): Had BM. Advanced diet. Pain controlled. Anemia labs ordered. Encourage mobility    Imaging and labs personally reviewed. Discussed case with patient, nurse, . Assessment & Plan:   Acute pancreatitis without infection or necrosis  Admission CT with swelling at pancreatic head, no necrosis.  Lipase ~1400  -hold empagliflozin  -NPO, advance as tolerated  -morphine  -fluid resuscitation  2023: better today, advance diet, get moving     Type 2 diabetes mellitus with hyperglycemia, without long-term current use of insulin  -discontinue liraglutide for intolerance  -hold metformin  -basal 22U  -SSI  2023: no SSI required today, continue basal 22U, no sGlc < 100     Moderate persistent asthma  -albuterol prn     Hypothyroidism  -levothyroxine      Primary hypertension  -amlodipine, HCTZ     Hyperlipidemia  -atorvastatin
Milk and molasses enema completed as ordered. Pt tolerated well. Pt had one BM prior to enema.
PT was resting in bed and close to falling asleep. [address] introduced self and offered support. Rev. Moi Tucker M.Div.   
Patient to room 364 from ED. VSS. Patient oriented to room and call light, son at bedside. All needs assessed at this time.
[facility]   [address] [facility] [address]  Phone: [phone]             May 12, 2023    Patient: Benson Zavala   YOB: 1965   Date of Visit: 5/8/2023       To Whom It May Concern:    Malick Hou was seen and treated in our facility  beginning 5/8/2023 until 05/12/2023. She may return to work on 05/17/2023 .       Sincerely,       Didier Corrales RN         Signature:__________________________________
TRANSFER - IN REPORT:    Verbal report received from [facility] on Daniella Plater  being received from ED for routine progression of patient care      Report consisted of patient's Situation, Background, Assessment and   Recommendations(SBAR). Information from the following report(s) Nurse Handoff Report was reviewed with the receiving nurse. Opportunity for questions and clarification was provided. Assessment completed upon patient's arrival to unit and care assumed.
hypertension  -amlodipine, HCTZ     Hyperlipidemia  -atorvastatin     Gastroesophageal reflux disease without esophagitis  -pantoprazole     Chronic recurrent major depressive disorder  -citalopram      Class 1 obesity due to excess calories with serious comorbidity and body mass index (BMI) of 31.0 to 31.9 in adult  Increased risk of all cause mortality, complicating care  - healthy lifestyle at discharge         PT/OT evals and PPD needed/ordered? NA  Diet:  Diet NPO Exceptions are: Ice Chips  VTE prophylaxis:  enoxaparin  Code status: Full Code    Hospital Problems:  Principal Problem:    Acute pancreatitis without infection or necrosis  Active Problems:    Type 2 diabetes mellitus with hyperglycemia, without long-term current use of insulin (HCC)    Moderate persistent asthma    Iron deficiency anemia    Hypothyroidism    Primary hypertension    Hyperlipidemia    Gastroesophageal reflux disease without esophagitis    Chronic recurrent major depressive disorder (HCC)    Class 1 obesity due to excess calories with serious comorbidity and body mass index (BMI) of 31.0 to 31.9 in adult  Resolved Problems:    * No resolved hospital problems. *      Objective:   Patient Vitals for the past 24 hrs:   Temp Pulse Resp BP SpO2   05/10/23 0735 98.2 °F (36.8 °C) (!) 108 18 (!) 153/80 100 %   05/09/23 2149 99.1 °F (37.3 °C) (!) 111 16 (!) 141/85 98 %   05/09/23 2128 -- -- 17 -- --   05/09/23 1806 -- -- 16 -- --   05/09/23 1218 -- -- 18 -- --   05/09/23 1138 -- (!) 113 -- -- --         Oxygen Therapy  SpO2: 100 %  Pulse via Oximetry: 112 beats per minute  O2 Device: None (Room air)    Estimated body mass index is 31.96 kg/m² as calculated from the following:    Height as of this encounter: 5' 6\" (1.676 m). Weight as of this encounter: 198 lb (89.8 kg).     Intake/Output Summary (Last 24 hours) at 5/10/2023 0903  Last data filed at 5/10/2023 0848  Gross per 24 hour   Intake 405 ml   Output 1350 ml   Net -945 ml
SBA CGA Min Mod Max Total NT Comments   BASIC ADLs:              Upper Body Bathing  [] [] [x] [] [] [] [] [] [] []    Lower Body Bathing [] [] [x] [] [] [] [] [] [] []    Toileting [] [] [x] [] [] [] [] [] [] []    Upper Body Dressing [] [] [x] [] [] [] [] [] [] []    Lower Body Dressing [] [] [x] [] [] [] [] [] [] []    Feeding [x] [] [] [] [] [] [] [] [] []    Grooming [] [] [x] [] [] [] [] [] [] []    Personal Device Care [] [] [] [] [] [] [] [] [] []    Functional Mobility [] [] [x] [] [] [] [] [] [] []    I=Independent, Mod I=Modified Independent, S=Supervision/Setup, SBA=Standby Assistance, CGA=Contact Guard Assistance, Min=Minimal Assistance, Mod=Moderate Assistance, Max=Maximal Assistance, Total=Total Assistance, NT=Not Tested    PLAN:   FREQUENCY/DURATION   OT Plan of Care:  (no further OT after treatment today) for duration of hospital stay or until stated goals are met, whichever comes first.    PROBLEM LIST:   (Skilled intervention is medically necessary to address:)  Decreased ADL/Functional Activities  Increased Pain   INTERVENTIONS PLANNED:  (Benefits and precautions of occupational therapy have been discussed with the patient.)  Self Care Training  Education         TREATMENT:     EVALUATION: LOW COMPLEXITY: (Untimed Charge)    TREATMENT:   Self Care (10 minutes): Patient participated in lower body dressing ADLs in unsupported sitting and standing with minimal verbal cueing to decrease assistance required, increase activity tolerance, and increase safety awareness. Patient also participated in functional mobility training to decrease assistance required, increase activity tolerance, and increase safety awareness. AFTER TREATMENT PRECAUTIONS: Bed, Bed/Chair Locked, Call light within reach, Needs within reach, RN notified, and Visitors at bedside    INTERDISCIPLINARY COLLABORATION:  RN/ PCT, PT/ PTA, and OT/ QUISPE    EDUCATION:  Education Given To: Patient; Family  Education Provided: Role of

## 2023-05-12 NOTE — CARE COORDINATION
05/12/23 1049   Service Assessment   Patient Orientation Alert and Oriented   Cognition Alert   History Provided By Patient   Primary [address] Family Members;Friends/Neighbors   PCP Verified by CM Yes  (A referral was sent to [facility])   Prior Functional Level Independent in ADLs/IADLs   Current Functional Level Independent in ADLs/IADLs  (She confirmed she is ambulatory.)   Can patient return to prior living arrangement Yes   Ability to make needs known: Good   Financial Resources Other (Comment)  (BCBS)   Community Resources None   Social/Functional History   Lives With Son   Type of [address] One level   [address] to enter with rails   Entrance Stairs - Number of Steps [address]   (She confirmed she has a home nebulizer and nebulizer medications at home.)   ADL Assistance Independent   Mode of Transportation Car   Discharge Planning   [facility] Durable Medical Equipment   Current DME Prior to Arrival   (nebulizer)   Potential Assistance Needed N/A   DME Ordered? No   Potential Assistance Purchasing Medications No   Type of Home Care Services None   Patient expects to be discharged to: [address] [address] Discharge   Services At/After Discharge None   Condition of Participation: Discharge Planning   The Plan for Transition of Care is related to the following treatment goals: Return to home. RN CM met with the patient and her friend at the bedside and discussed discharge planning. She is independent of ADLs and does not external services. She has nebulizer at home. She confirmed she has transportation at discharge and lives with her son. RN CM encouraged her to ask questions. She verbalized understanding and agreement with the discharge plan. No case management needs were identified.

## 2023-05-14 LAB — TRANSFERRIN SERPL-SCNC: 15.1 NMOL/L (ref 12.2–27.3)

## 2023-06-07 ENCOUNTER — HOSPITAL ENCOUNTER (EMERGENCY)
Age: 58
Discharge: HOME | End: 2023-06-07
Attending: EMERGENCY MEDICINE
Payer: COMMERCIAL

## 2023-06-07 ENCOUNTER — APPOINTMENT (OUTPATIENT)
Dept: CT IMAGING | Age: 58
End: 2023-06-07
Payer: COMMERCIAL

## 2023-06-07 VITALS
BODY MASS INDEX: 28.88 KG/M2 | OXYGEN SATURATION: 99 % | HEART RATE: 83 BPM | SYSTOLIC BLOOD PRESSURE: 138 MMHG | TEMPERATURE: 98.3 F | WEIGHT: 184 LBS | HEIGHT: 67 IN | RESPIRATION RATE: 14 BRPM | DIASTOLIC BLOOD PRESSURE: 86 MMHG

## 2023-06-07 DIAGNOSIS — R10.84 GENERALIZED ABDOMINAL PAIN: Primary | ICD-10-CM

## 2023-06-07 DIAGNOSIS — N30.90 CYSTITIS WITHOUT HEMATURIA: ICD-10-CM

## 2023-06-07 DIAGNOSIS — R10.9 FLANK PAIN: ICD-10-CM

## 2023-06-07 LAB
ANION GAP SERPL CALC-SCNC: 4 MMOL/L (ref 2–11)
APPEARANCE UR: CLEAR
BACTERIA URNS QL MICRO: NEGATIVE /HPF
BASOPHILS # BLD: 0 K/UL (ref 0–0.2)
BASOPHILS NFR BLD: 1 % (ref 0–2)
BILIRUB UR QL: NEGATIVE
BUN SERPL-MCNC: 11 MG/DL (ref 6–23)
CALCIUM SERPL-MCNC: 9.7 MG/DL (ref 8.3–10.4)
CASTS URNS QL MICRO: ABNORMAL /LPF
CHLORIDE SERPL-SCNC: 107 MMOL/L (ref 101–110)
CO2 SERPL-SCNC: 30 MMOL/L (ref 21–32)
COLOR UR: ABNORMAL
CREAT SERPL-MCNC: 0.82 MG/DL (ref 0.6–1)
DIFFERENTIAL METHOD BLD: ABNORMAL
EOSINOPHIL # BLD: 0.3 K/UL (ref 0–0.8)
EOSINOPHIL NFR BLD: 5 % (ref 0.5–7.8)
EPI CELLS #/AREA URNS HPF: ABNORMAL /HPF
ERYTHROCYTE [DISTWIDTH] IN BLOOD BY AUTOMATED COUNT: 15.6 % (ref 11.9–14.6)
GLUCOSE SERPL-MCNC: 177 MG/DL (ref 65–100)
GLUCOSE UR STRIP.AUTO-MCNC: NEGATIVE MG/DL
HCT VFR BLD AUTO: 36.9 % (ref 35.8–46.3)
HGB BLD-MCNC: 11.7 G/DL (ref 11.7–15.4)
HGB UR QL STRIP: NEGATIVE
IMM GRANULOCYTES # BLD AUTO: 0 K/UL (ref 0–0.5)
IMM GRANULOCYTES NFR BLD AUTO: 0 % (ref 0–5)
KETONES UR QL STRIP.AUTO: ABNORMAL MG/DL
LEUKOCYTE ESTERASE UR QL STRIP.AUTO: ABNORMAL
LIPASE SERPL-CCNC: 96 U/L (ref 73–393)
LYMPHOCYTES # BLD: 1.9 K/UL (ref 0.5–4.6)
LYMPHOCYTES NFR BLD: 35 % (ref 13–44)
MCH RBC QN AUTO: 23 PG (ref 26.1–32.9)
MCHC RBC AUTO-ENTMCNC: 31.7 G/DL (ref 31.4–35)
MCV RBC AUTO: 72.6 FL (ref 82–102)
MONOCYTES # BLD: 0.6 K/UL (ref 0.1–1.3)
MONOCYTES NFR BLD: 10 % (ref 4–12)
NEUTS SEG # BLD: 2.7 K/UL (ref 1.7–8.2)
NEUTS SEG NFR BLD: 49 % (ref 43–78)
NITRITE UR QL STRIP.AUTO: NEGATIVE
NRBC # BLD: 0 K/UL (ref 0–0.2)
PH UR STRIP: 6 (ref 5–9)
PLATELET # BLD AUTO: 301 K/UL (ref 150–450)
PMV BLD AUTO: 10.5 FL (ref 9.4–12.3)
POTASSIUM SERPL-SCNC: 3.5 MMOL/L (ref 3.5–5.1)
PROT UR STRIP-MCNC: ABNORMAL MG/DL
RBC # BLD AUTO: 5.08 M/UL (ref 4.05–5.2)
RBC #/AREA URNS HPF: ABNORMAL /HPF
SODIUM SERPL-SCNC: 141 MMOL/L (ref 133–143)
SP GR UR REFRACTOMETRY: 1.02 (ref 1–1.02)
UROBILINOGEN UR QL STRIP.AUTO: 1 EU/DL (ref 0.2–1)
WBC # BLD AUTO: 5.5 K/UL (ref 4.3–11.1)
WBC URNS QL MICRO: ABNORMAL /HPF

## 2023-06-07 PROCEDURE — 74177 CT ABD & PELVIS W/CONTRAST: CPT

## 2023-06-07 PROCEDURE — 83690 ASSAY OF LIPASE: CPT

## 2023-06-07 PROCEDURE — 74176 CT ABD & PELVIS W/O CONTRAST: CPT

## 2023-06-07 PROCEDURE — 99285 EMERGENCY DEPT VISIT HI MDM: CPT

## 2023-06-07 PROCEDURE — 81001 URINALYSIS AUTO W/SCOPE: CPT

## 2023-06-07 PROCEDURE — 6360000004 HC RX CONTRAST MEDICATION

## 2023-06-07 PROCEDURE — 85025 COMPLETE CBC W/AUTO DIFF WBC: CPT

## 2023-06-07 PROCEDURE — 80048 BASIC METABOLIC PNL TOTAL CA: CPT

## 2023-06-07 RX ORDER — SULFAMETHOXAZOLE AND TRIMETHOPRIM 800; 160 MG/1; MG/1
1 TABLET ORAL 2 TIMES DAILY
Qty: 10 TABLET | Refills: 0 | Status: SHIPPED | OUTPATIENT
Start: 2023-06-07 | End: 2023-06-12

## 2023-06-07 RX ADMIN — IOPAMIDOL 100 ML: 755 INJECTION, SOLUTION INTRAVENOUS at 17:12

## 2023-06-07 RX ADMIN — DIATRIZOATE MEGLUMINE AND DIATRIZOATE SODIUM 15 ML: 660; 100 LIQUID ORAL; RECTAL at 16:10

## 2023-06-07 ASSESSMENT — PAIN DESCRIPTION - ORIENTATION: ORIENTATION: RIGHT

## 2023-06-07 ASSESSMENT — PAIN DESCRIPTION - LOCATION: LOCATION: ABDOMEN;FLANK;LEG

## 2023-06-07 ASSESSMENT — PAIN - FUNCTIONAL ASSESSMENT: PAIN_FUNCTIONAL_ASSESSMENT: 0-10

## 2023-06-07 ASSESSMENT — PAIN SCALES - GENERAL: PAINLEVEL_OUTOF10: 4

## 2023-06-07 NOTE — ED NOTES
I have reviewed discharge instructions with the patient. The patient verbalized understanding. Patient left ED via Discharge Method: ambulatory to Home with family/friend. Opportunity for questions and clarification provided. Patient given 0 scripts. To continue your aftercare when you leave the hospital, you may receive an automated call from our care team to check in on how you are doing. This is a free service and part of our promise to provide the best care and service to meet your aftercare needs.  If you have questions, or wish to unsubscribe from this service please call [phone]. Thank you for Choosing our [facility] Emergency Department. Selene Horton, [address] [place] [address]  06/07/23 7947

## 2023-06-07 NOTE — DISCHARGE INSTRUCTIONS
There were no concerning findings on your work-up today in the emergency department however it is a good idea for you to have close follow-up with a primary care provider that you may have repeat imaging in 1 to 2 months to follow-up on the suspicious structure found near your pancreas. See the phone number below to help you establish with a primary care provider. If your symptoms worsen return to the emergency department. We would love to help you get a primary care doctor for follow-up after your emergency department visit. Please call [phone] between 7AM - 6PM Monday to Friday. A care navigator will be able to assist you with setting up a doctor close to your home.

## 2023-06-07 NOTE — ED NOTES
Pt finished drinking Gastroview at this time. CT notified. General Has  Erlinda Hawthorne RN  06/07/23 6857

## 2023-06-07 NOTE — ED TRIAGE NOTES
Patient complaining of continued lower abdominal pain, now with right flank pain and radiation into hip. Patient advises some diarrhea. Patient advises she also has been having urinary urgency with frequency. Patient advises known kidney stone when she was here last month with pancreatitis.

## 2023-06-07 NOTE — ED PROVIDER NOTES
past.  She states she is also having pain with urination and urinary hesitancy. Denies low back pain and saddle anesthesias. Denies associated nausea and vomiting. Has taken some Aleve at home which she states has been somewhat helpful for improvement of symptoms but states she is having a persistent nagging pain in her right flank area. The history is provided by the patient. Review of Systems    Physical Exam     Vitals signs and nursing note reviewed:  Vitals:    06/07/23 1308 06/07/23 1845   BP: (!) 140/84 138/86   Pulse: 76 83   Resp: 16 14   Temp: 98.3 °F (36.8 °C)    TempSrc: Oral    SpO2: 100% 99%   Weight: 184 lb (83.5 kg)    Height: 5' 7\" (1.702 m)       Physical Exam  Vitals reviewed. Constitutional:       General: She is not in acute distress. Appearance: Normal appearance. She is normal weight. She is not ill-appearing. HENT:      Head: Normocephalic and atraumatic. Eyes:      Extraocular Movements: Extraocular movements intact. Pupils: Pupils are equal, round, and reactive to light. Cardiovascular:      Rate and Rhythm: Normal rate. Pulmonary:      Effort: Pulmonary effort is normal. No respiratory distress. Abdominal:      Tenderness: There is abdominal tenderness in the right upper quadrant, right lower quadrant and epigastric area. There is no guarding or rebound. Musculoskeletal:         General: Normal range of motion. Neurological:      Mental Status: She is alert.         Procedures     Procedures    Orders Placed This Encounter   Procedures    CT ABDOMEN PELVIS RENAL STONE    CT ABDOMEN PELVIS W IV CONTRAST Additional Contrast? None    BMP    Urinalysis    CBC with Auto Differential    Lipase    Saline lock IV        Medications   diatrizoate meglumine-sodium (GASTROGRAFIN) 66-10 % solution 15 mL (15 mLs Oral Given 6/7/23 1610)   iopamidol (ISOVUE-370) 76 % injection 100 mL (100 mLs IntraVENous Given 6/7/23 1712)       Discharge Medication List as of 6/7/2023

## 2023-06-27 SDOH — HEALTH STABILITY: PHYSICAL HEALTH: ON AVERAGE, HOW MANY MINUTES DO YOU ENGAGE IN EXERCISE AT THIS LEVEL?: 10 MIN

## 2023-06-30 ENCOUNTER — OFFICE VISIT (OUTPATIENT)
Dept: FAMILY MEDICINE CLINIC | Facility: CLINIC | Age: 58
End: 2023-06-30
Payer: COMMERCIAL

## 2023-06-30 VITALS
RESPIRATION RATE: 17 BRPM | HEART RATE: 74 BPM | DIASTOLIC BLOOD PRESSURE: 84 MMHG | WEIGHT: 192 LBS | HEIGHT: 67 IN | TEMPERATURE: 97 F | BODY MASS INDEX: 30.13 KG/M2 | SYSTOLIC BLOOD PRESSURE: 136 MMHG

## 2023-06-30 DIAGNOSIS — F33.1 MODERATE EPISODE OF RECURRENT MAJOR DEPRESSIVE DISORDER (HCC): ICD-10-CM

## 2023-06-30 DIAGNOSIS — Z01.419 WOMEN'S ANNUAL ROUTINE GYNECOLOGICAL EXAMINATION: ICD-10-CM

## 2023-06-30 DIAGNOSIS — Z12.31 ENCOUNTER FOR SCREENING MAMMOGRAM FOR MALIGNANT NEOPLASM OF BREAST: ICD-10-CM

## 2023-06-30 DIAGNOSIS — R68.82 LOW LIBIDO: ICD-10-CM

## 2023-06-30 DIAGNOSIS — E11.65 TYPE 2 DIABETES MELLITUS WITH HYPERGLYCEMIA, WITHOUT LONG-TERM CURRENT USE OF INSULIN (HCC): Primary | ICD-10-CM

## 2023-06-30 PROCEDURE — 99204 OFFICE O/P NEW MOD 45 MIN: CPT

## 2023-06-30 PROCEDURE — 3052F HG A1C>EQUAL 8.0%<EQUAL 9.0%: CPT

## 2023-06-30 PROCEDURE — 3075F SYST BP GE 130 - 139MM HG: CPT

## 2023-06-30 PROCEDURE — 3079F DIAST BP 80-89 MM HG: CPT

## 2023-06-30 RX ORDER — FAMOTIDINE 40 MG/1
TABLET, FILM COATED ORAL
COMMUNITY
Start: 2023-04-14

## 2023-06-30 RX ORDER — OMEPRAZOLE 40 MG/1
40 CAPSULE, DELAYED RELEASE ORAL 2 TIMES DAILY
COMMUNITY
Start: 2023-04-14

## 2023-06-30 RX ORDER — METFORMIN HYDROCHLORIDE 500 MG/1
1000 TABLET, EXTENDED RELEASE ORAL 2 TIMES DAILY
Qty: 180 TABLET | Refills: 1 | Status: SHIPPED | OUTPATIENT
Start: 2023-06-30

## 2023-06-30 RX ORDER — ONDANSETRON 4 MG/1
TABLET, ORALLY DISINTEGRATING ORAL
COMMUNITY
Start: 2023-04-14

## 2023-06-30 SDOH — ECONOMIC STABILITY: FOOD INSECURITY: WITHIN THE PAST 12 MONTHS, YOU WORRIED THAT YOUR FOOD WOULD RUN OUT BEFORE YOU GOT MONEY TO BUY MORE.: NEVER TRUE

## 2023-06-30 SDOH — ECONOMIC STABILITY: FOOD INSECURITY: WITHIN THE PAST 12 MONTHS, THE FOOD YOU BOUGHT JUST DIDN'T LAST AND YOU DIDN'T HAVE MONEY TO GET MORE.: NEVER TRUE

## 2023-06-30 SDOH — ECONOMIC STABILITY: INCOME INSECURITY: HOW HARD IS IT FOR YOU TO PAY FOR THE VERY BASICS LIKE FOOD, HOUSING, MEDICAL CARE, AND HEATING?: VERY HARD

## 2023-06-30 SDOH — ECONOMIC STABILITY: HOUSING INSECURITY
IN THE LAST 12 MONTHS, WAS THERE A TIME WHEN YOU DID NOT HAVE A STEADY PLACE TO SLEEP OR SLEPT IN A SHELTER (INCLUDING NOW)?: NO

## 2023-06-30 ASSESSMENT — PATIENT HEALTH QUESTIONNAIRE - PHQ9
SUM OF ALL RESPONSES TO PHQ QUESTIONS 1-9: 0
SUM OF ALL RESPONSES TO PHQ9 QUESTIONS 1 & 2: 0
3. TROUBLE FALLING OR STAYING ASLEEP: 0
SUM OF ALL RESPONSES TO PHQ QUESTIONS 1-9: 0
SUM OF ALL RESPONSES TO PHQ9 QUESTIONS 1 & 2: 0
SUM OF ALL RESPONSES TO PHQ QUESTIONS 1-9: 0
1. LITTLE INTEREST OR PLEASURE IN DOING THINGS: 0
6. FEELING BAD ABOUT YOURSELF - OR THAT YOU ARE A FAILURE OR HAVE LET YOURSELF OR YOUR FAMILY DOWN: 0
7. TROUBLE CONCENTRATING ON THINGS, SUCH AS READING THE NEWSPAPER OR WATCHING TELEVISION: 0
SUM OF ALL RESPONSES TO PHQ QUESTIONS 1-9: 0
9. THOUGHTS THAT YOU WOULD BE BETTER OFF DEAD, OR OF HURTING YOURSELF: 0
2. FEELING DOWN, DEPRESSED OR HOPELESS: 0
4. FEELING TIRED OR HAVING LITTLE ENERGY: 0
SUM OF ALL RESPONSES TO PHQ QUESTIONS 1-9: 0
SUM OF ALL RESPONSES TO PHQ QUESTIONS 1-9: 0
8. MOVING OR SPEAKING SO SLOWLY THAT OTHER PEOPLE COULD HAVE NOTICED. OR THE OPPOSITE, BEING SO FIGETY OR RESTLESS THAT YOU HAVE BEEN MOVING AROUND A LOT MORE THAN USUAL: 0
1. LITTLE INTEREST OR PLEASURE IN DOING THINGS: 0
SUM OF ALL RESPONSES TO PHQ QUESTIONS 1-9: 0
5. POOR APPETITE OR OVEREATING: 0
2. FEELING DOWN, DEPRESSED OR HOPELESS: 0
SUM OF ALL RESPONSES TO PHQ QUESTIONS 1-9: 0
10. IF YOU CHECKED OFF ANY PROBLEMS, HOW DIFFICULT HAVE THESE PROBLEMS MADE IT FOR YOU TO DO YOUR WORK, TAKE CARE OF THINGS AT HOME, OR GET ALONG WITH OTHER PEOPLE: 0

## 2023-06-30 ASSESSMENT — ENCOUNTER SYMPTOMS
EYES NEGATIVE: 1
RESPIRATORY NEGATIVE: 1
ALLERGIC/IMMUNOLOGIC NEGATIVE: 1
GASTROINTESTINAL NEGATIVE: 1

## 2023-08-02 ENCOUNTER — APPOINTMENT (OUTPATIENT)
Dept: GENERAL RADIOLOGY | Age: 58
End: 2023-08-02
Payer: COMMERCIAL

## 2023-08-02 ENCOUNTER — HOSPITAL ENCOUNTER (EMERGENCY)
Age: 58
Discharge: HOME | End: 2023-08-02
Attending: EMERGENCY MEDICINE
Payer: COMMERCIAL

## 2023-08-02 VITALS
DIASTOLIC BLOOD PRESSURE: 96 MMHG | HEART RATE: 85 BPM | OXYGEN SATURATION: 98 % | RESPIRATION RATE: 12 BRPM | WEIGHT: 198 LBS | HEIGHT: 67 IN | TEMPERATURE: 98.1 F | SYSTOLIC BLOOD PRESSURE: 178 MMHG | BODY MASS INDEX: 31.08 KG/M2

## 2023-08-02 DIAGNOSIS — J40 BRONCHITIS: Primary | ICD-10-CM

## 2023-08-02 PROBLEM — R07.89 ATYPICAL CHEST PAIN: Status: ACTIVE | Noted: 2023-08-02

## 2023-08-02 LAB
ALBUMIN SERPL-MCNC: 4 G/DL (ref 3.5–5)
ALBUMIN/GLOB SERPL: 0.8 (ref 0.4–1.6)
ALP SERPL-CCNC: 65 U/L (ref 50–136)
ALT SERPL-CCNC: 32 U/L (ref 12–65)
ANION GAP SERPL CALC-SCNC: 3 MMOL/L (ref 2–11)
AST SERPL-CCNC: 15 U/L (ref 15–37)
BASOPHILS # BLD: 0 K/UL (ref 0–0.2)
BASOPHILS NFR BLD: 0 % (ref 0–2)
BILIRUB SERPL-MCNC: 0.2 MG/DL (ref 0.2–1.1)
BUN SERPL-MCNC: 15 MG/DL (ref 6–23)
CALCIUM SERPL-MCNC: 9.2 MG/DL (ref 8.3–10.4)
CHLORIDE SERPL-SCNC: 107 MMOL/L (ref 101–110)
CO2 SERPL-SCNC: 30 MMOL/L (ref 21–32)
CREAT SERPL-MCNC: 1.03 MG/DL (ref 0.6–1)
DIFFERENTIAL METHOD BLD: ABNORMAL
EOSINOPHIL # BLD: 0.3 K/UL (ref 0–0.8)
EOSINOPHIL NFR BLD: 4 % (ref 0.5–7.8)
ERYTHROCYTE [DISTWIDTH] IN BLOOD BY AUTOMATED COUNT: 15.9 % (ref 11.9–14.6)
GLOBULIN SER CALC-MCNC: 4.8 G/DL (ref 2.8–4.5)
GLUCOSE SERPL-MCNC: 210 MG/DL (ref 65–100)
HCT VFR BLD AUTO: 35.9 % (ref 35.8–46.3)
HGB BLD-MCNC: 11.4 G/DL (ref 11.7–15.4)
IMM GRANULOCYTES # BLD AUTO: 0 K/UL (ref 0–0.5)
IMM GRANULOCYTES NFR BLD AUTO: 0 % (ref 0–5)
LYMPHOCYTES # BLD: 1.9 K/UL (ref 0.5–4.6)
LYMPHOCYTES NFR BLD: 32 % (ref 13–44)
MCH RBC QN AUTO: 23.6 PG (ref 26.1–32.9)
MCHC RBC AUTO-ENTMCNC: 31.8 G/DL (ref 31.4–35)
MCV RBC AUTO: 74.3 FL (ref 82–102)
MONOCYTES # BLD: 0.4 K/UL (ref 0.1–1.3)
MONOCYTES NFR BLD: 7 % (ref 4–12)
NEUTS SEG # BLD: 3.4 K/UL (ref 1.7–8.2)
NEUTS SEG NFR BLD: 57 % (ref 43–78)
NRBC # BLD: 0 K/UL (ref 0–0.2)
PLATELET # BLD AUTO: 314 K/UL (ref 150–450)
PMV BLD AUTO: 10.1 FL (ref 9.4–12.3)
POTASSIUM SERPL-SCNC: 3.3 MMOL/L (ref 3.5–5.1)
PROT SERPL-MCNC: 8.8 G/DL (ref 6.3–8.2)
RBC # BLD AUTO: 4.83 M/UL (ref 4.05–5.2)
SODIUM SERPL-SCNC: 140 MMOL/L (ref 133–143)
WBC # BLD AUTO: 6 K/UL (ref 4.3–11.1)

## 2023-08-02 PROCEDURE — 71046 X-RAY EXAM CHEST 2 VIEWS: CPT

## 2023-08-02 PROCEDURE — 94640 AIRWAY INHALATION TREATMENT: CPT

## 2023-08-02 PROCEDURE — 99284 EMERGENCY DEPT VISIT MOD MDM: CPT

## 2023-08-02 PROCEDURE — 80053 COMPREHEN METABOLIC PANEL: CPT

## 2023-08-02 PROCEDURE — 94760 N-INVAS EAR/PLS OXIMETRY 1: CPT

## 2023-08-02 PROCEDURE — 6370000000 HC RX 637 (ALT 250 FOR IP)

## 2023-08-02 PROCEDURE — 85025 COMPLETE CBC W/AUTO DIFF WBC: CPT

## 2023-08-02 RX ORDER — DOXYCYCLINE HYCLATE 100 MG
100 TABLET ORAL 2 TIMES DAILY
Qty: 14 TABLET | Refills: 0 | Status: SHIPPED | OUTPATIENT
Start: 2023-08-02 | End: 2023-08-09

## 2023-08-02 RX ORDER — PREDNISONE 20 MG/1
20 TABLET ORAL DAILY
Qty: 5 TABLET | Refills: 0 | Status: SHIPPED | OUTPATIENT
Start: 2023-08-02 | End: 2023-08-07

## 2023-08-02 RX ORDER — IPRATROPIUM BROMIDE AND ALBUTEROL SULFATE 2.5; .5 MG/3ML; MG/3ML
1 SOLUTION RESPIRATORY (INHALATION)
Status: COMPLETED | OUTPATIENT
Start: 2023-08-02 | End: 2023-08-02

## 2023-08-02 RX ADMIN — IPRATROPIUM BROMIDE AND ALBUTEROL SULFATE 1 DOSE: .5; 3 SOLUTION RESPIRATORY (INHALATION) at 21:03

## 2023-08-02 ASSESSMENT — LIFESTYLE VARIABLES
HOW MANY STANDARD DRINKS CONTAINING ALCOHOL DO YOU HAVE ON A TYPICAL DAY: 1 OR 2
HOW OFTEN DO YOU HAVE A DRINK CONTAINING ALCOHOL: MONTHLY OR LESS

## 2023-08-02 ASSESSMENT — PAIN - FUNCTIONAL ASSESSMENT: PAIN_FUNCTIONAL_ASSESSMENT: NONE - DENIES PAIN

## 2023-08-02 NOTE — ED TRIAGE NOTES
Pt ambulatory to triage. Pt c/o recurrent cough that wont go away. PT states she has hx of gerd and thought this might be a flare up or something to do with allergies. Pt reports dry unproductive cough.

## 2023-08-03 NOTE — ED PROVIDER NOTES
mmol/L    Chloride 107 101 - 110 mmol/L    CO2 30 21 - 32 mmol/L    Anion Gap 3 2 - 11 mmol/L    Glucose 210 (H) 65 - 100 mg/dL    BUN 15 6 - 23 MG/DL    Creatinine 1.03 (H) 0.6 - 1.0 MG/DL    Est, Glom Filt Rate >60 >60 ml/min/1.73m2    Calcium 9.2 8.3 - 10.4 MG/DL    Total Bilirubin 0.2 0.2 - 1.1 MG/DL    ALT 32 12 - 65 U/L    AST 15 15 - 37 U/L    Alk Phosphatase 65 50 - 136 U/L    Total Protein 8.8 (H) 6.3 - 8.2 g/dL    Albumin 4.0 3.5 - 5.0 g/dL    Globulin 4.8 (H) 2.8 - 4.5 g/dL    Albumin/Globulin Ratio 0.8 0.4 - 1.6     CBC with Auto Differential   Result Value Ref Range    WBC 6.0 4.3 - 11.1 K/uL    RBC 4.83 4.05 - 5.2 M/uL    Hemoglobin 11.4 (L) 11.7 - 15.4 g/dL    Hematocrit 35.9 35.8 - 46.3 %    MCV 74.3 (L) 82.0 - 102.0 FL    MCH 23.6 (L) 26.1 - 32.9 PG    MCHC 31.8 31.4 - 35.0 g/dL    RDW 15.9 (H) 11.9 - 14.6 %    Platelets 307 281 - 497 K/uL    MPV 10.1 9.4 - 12.3 FL    nRBC 0.00 0.0 - 0.2 K/uL    Differential Type AUTOMATED      Neutrophils % 57 43 - 78 %    Lymphocytes % 32 13 - 44 %    Monocytes % 7 4.0 - 12.0 %    Eosinophils % 4 0.5 - 7.8 %    Basophils % 0 0.0 - 2.0 %    Immature Granulocytes 0 0.0 - 5.0 %    Neutrophils Absolute 3.4 1.7 - 8.2 K/UL    Lymphocytes Absolute 1.9 0.5 - 4.6 K/UL    Monocytes Absolute 0.4 0.1 - 1.3 K/UL    Eosinophils Absolute 0.3 0.0 - 0.8 K/UL    Basophils Absolute 0.0 0.0 - 0.2 K/UL    Absolute Immature Granulocyte 0.0 0.0 - 0.5 K/UL        XR CHEST (2 VW)   Final Result   No radiographic evidence of acute cardiopulmonary abnormality. Thank you for the referral of this patient. This exam was interpreted by an    American Board of Radiology certified radiologist with subspecialty fellowship    in [address] [place] [address]. If there are any questions regarding this exam please feel    free to contact us directly at [phone]. Moshe Newton M.D.    8/2/2023 9:03:00 PM            Voice dictation software was used during the making of this note.   This

## 2023-08-03 NOTE — DISCHARGE INSTRUCTIONS
Evaluated here in the emergency department today for 2 weeks of dry cough    Chest x-ray is clear  Lab work is reassuring    Written you prescription for antibiotic called doxycycline for bronchitis     I have written you a prescription for a steroid called prednisone to be started tomorrow. You need to check your blood sugars regularly and more frequently after starting this medication. This medication can can sometimes cause elevated blood sugars.   You need to pay attention to your diet and avoid starches, sugars, sweets    Call your primary care doctor tomorrow to schedule follow-up within the next week    Return to the emergency department for chest pain, shortness of breath, signs and symptoms of stroke as listed in your discharge paperwork

## 2023-08-28 NOTE — PROGRESS NOTES
TAY Pulido is a 66 y.o. female seen for annual GYN exam.  She has been having spotting for about a day each month. She does have other menopause symptoms. Past Medical History, Past Surgical History, Family history, Social History, and Medications were all reviewed with the patient today and updated as necessary. Current Outpatient Medications   Medication Sig    Cholecalciferol 50 MCG (2000 UT) TABS Take 1 tablet every day by oral route as directed for 90 days. famotidine (PEPCID) 40 MG tablet     omeprazole (PRILOSEC) 40 MG delayed release capsule 1 capsule in the morning and at bedtime    ondansetron (ZOFRAN-ODT) 4 MG disintegrating tablet     metFORMIN (GLUCOPHAGE-XR) 500 MG extended release tablet Take 2 tablets by mouth in the morning and at bedtime    atorvastatin (LIPITOR) 10 MG tablet Take 1 tablet by mouth daily    citalopram (CELEXA) 20 MG tablet Take 1 tablet by mouth daily    hydroCHLOROthiazide (HYDRODIURIL) 25 MG tablet Take 1 tablet by mouth daily    amLODIPine (NORVASC) 10 MG tablet Take 1 tablet by mouth daily    levothyroxine (SYNTHROID) 150 MCG tablet Take 1 tablet by mouth every morning (before breakfast)     No current facility-administered medications for this visit.      Allergies   Allergen Reactions    Latex Anaphylaxis    Kiwi Extract Shortness Of Breath    Braden Flavor Shortness Of Breath    Shellfish Allergy Anaphylaxis    Yellow Dye Shortness Of Breath    Losartan Cough    Jardiance [Empagliflozin] Other (See Comments)     pancreatitis     Past Medical History:   Diagnosis Date    Acute non-recurrent maxillary sinusitis 12/26/2021    Acute pancreatitis without infection or necrosis 05/08/2023    Asthma     Chronic cough     Cough 12/26/2021    Diabetes ([address])     Endocrine disease     Hypothyroid    GERD (gastroesophageal reflux disease)     Hx of bronchitis     Hypertension 02/08/2021    Nasal congestion 12/26/2021    Other ill-defined conditions(799.89)

## 2023-08-30 ENCOUNTER — APPOINTMENT (OUTPATIENT)
Dept: GENERAL RADIOLOGY | Age: 58
End: 2023-08-30
Payer: COMMERCIAL

## 2023-08-30 ENCOUNTER — HOSPITAL ENCOUNTER (EMERGENCY)
Age: 58
Discharge: HOME | End: 2023-08-30
Attending: GENERAL PRACTICE
Payer: COMMERCIAL

## 2023-08-30 ENCOUNTER — OFFICE VISIT (OUTPATIENT)
Dept: OBGYN CLINIC | Age: 58
End: 2023-08-30
Payer: COMMERCIAL

## 2023-08-30 VITALS
WEIGHT: 190 LBS | OXYGEN SATURATION: 99 % | HEIGHT: 67 IN | BODY MASS INDEX: 29.82 KG/M2 | DIASTOLIC BLOOD PRESSURE: 98 MMHG | RESPIRATION RATE: 16 BRPM | TEMPERATURE: 98.5 F | HEART RATE: 81 BPM | SYSTOLIC BLOOD PRESSURE: 155 MMHG

## 2023-08-30 VITALS
WEIGHT: 190 LBS | HEIGHT: 67 IN | BODY MASS INDEX: 29.82 KG/M2 | DIASTOLIC BLOOD PRESSURE: 90 MMHG | SYSTOLIC BLOOD PRESSURE: 138 MMHG

## 2023-08-30 DIAGNOSIS — Z12.4 ENCOUNTER FOR PAPANICOLAOU SMEAR FOR CERVICAL CANCER SCREENING: ICD-10-CM

## 2023-08-30 DIAGNOSIS — M79.672 LEFT FOOT PAIN: ICD-10-CM

## 2023-08-30 DIAGNOSIS — R05.1 ACUTE COUGH: Primary | ICD-10-CM

## 2023-08-30 DIAGNOSIS — Z01.419 WELL WOMAN EXAM: Primary | ICD-10-CM

## 2023-08-30 DIAGNOSIS — Z12.31 VISIT FOR SCREENING MAMMOGRAM: ICD-10-CM

## 2023-08-30 DIAGNOSIS — N92.6 IRREGULAR BLEEDING: ICD-10-CM

## 2023-08-30 LAB — FSH SERPL-ACNC: 50.8 MIU/ML

## 2023-08-30 PROCEDURE — 99386 PREV VISIT NEW AGE 40-64: CPT | Performed by: OBSTETRICS & GYNECOLOGY

## 2023-08-30 PROCEDURE — 99283 EMERGENCY DEPT VISIT LOW MDM: CPT

## 2023-08-30 PROCEDURE — 73630 X-RAY EXAM OF FOOT: CPT

## 2023-08-30 PROCEDURE — 3075F SYST BP GE 130 - 139MM HG: CPT | Performed by: OBSTETRICS & GYNECOLOGY

## 2023-08-30 PROCEDURE — 71046 X-RAY EXAM CHEST 2 VIEWS: CPT

## 2023-08-30 PROCEDURE — 3080F DIAST BP >= 90 MM HG: CPT | Performed by: OBSTETRICS & GYNECOLOGY

## 2023-08-30 RX ORDER — HYDROCODONE BITARTRATE AND HOMATROPINE METHYLBROMIDE ORAL SOLUTION 5; 1.5 MG/5ML; MG/5ML
5 LIQUID ORAL 4 TIMES DAILY PRN
Qty: 45 ML | Refills: 0 | Status: SHIPPED | OUTPATIENT
Start: 2023-08-30 | End: 2023-09-02

## 2023-08-30 ASSESSMENT — PAIN - FUNCTIONAL ASSESSMENT
PAIN_FUNCTIONAL_ASSESSMENT: ACTIVITIES ARE NOT PREVENTED
PAIN_FUNCTIONAL_ASSESSMENT: 0-10

## 2023-08-30 ASSESSMENT — PAIN DESCRIPTION - ORIENTATION: ORIENTATION: LEFT

## 2023-08-30 ASSESSMENT — PAIN SCALES - GENERAL: PAINLEVEL_OUTOF10: 8

## 2023-08-30 ASSESSMENT — PAIN DESCRIPTION - DESCRIPTORS: DESCRIPTORS: SHARP;PRESSURE

## 2023-08-30 ASSESSMENT — PAIN DESCRIPTION - LOCATION: LOCATION: FOOT

## 2023-08-30 NOTE — ED PROVIDER NOTES
Procedures     Procedures     Orders Placed This Encounter   Procedures    XR FOOT LEFT (MIN 3 VIEWS)    XR CHEST (2 VW)        Medications given during this emergency department visit:  Medications - No data to display    Discharge Medication List as of 8/30/2023  1:03 PM        START taking these medications    Details   HYDROcodone homatropine (HYCODAN) 5-1.5 MG/5ML solution Take 5 mLs by mouth 4 times daily as needed (cough) for up to 3 days. Max Daily Amount: 20 mLs, Disp-45 mL, R-0Print              Past Medical History:   Diagnosis Date    Acute non-recurrent maxillary sinusitis 12/26/2021    Acute pancreatitis without infection or necrosis 05/08/2023    Asthma     Chronic cough     Cough 12/26/2021    Diabetes ([address])     Endocrine disease     Hypothyroid    GERD (gastroesophageal reflux disease)     Hx of bronchitis     Hypertension 02/08/2021    Nasal congestion 12/26/2021    Other ill-defined conditions(799.89)     Fibromyalgia    UTI (urinary tract infection)         Past Surgical History:   Procedure Laterality Date    GYN      Tubal        Results for orders placed or performed during the hospital encounter of 08/30/23   XR FOOT LEFT (MIN 3 VIEWS)    Narrative    EXAMINATION: XR FOOT LEFT (MIN 3 VIEWS) 8/30/2023 12:27 PM    ACCESSION NUMBER: [id number]    COMPARISON: None available    INDICATION: Left 4th and 5th toe injury    TECHNIQUE: 3 views of the left foot were obtained. FINDINGS:  No acute fracture or dislocation identified. Alignment is normal. Mild great toe  metatarsophalangeal joint osteoarthrosis. Posterior Achilles insertional and  plantar calcaneal enthesophytes. Bipartite medial hallux sesamoid. No osseous  erosion or focal periosteal reaction. No focal soft tissue abnormality. No  radiopaque foreign bodies are seen. Impression    No acute osseous abnormality, left foot.    XR CHEST (2 VW)    Narrative    EXAMINATION: XR CHEST (2 VW) 8/30/2023 12:27 PM    ACCESSION NUMBER:

## 2023-08-30 NOTE — ED TRIAGE NOTES
Patient ambulatory to triage. Patient c\o of L forth and fifth tow pain after she stubbed her toe last night. Patient also states she was diagnosed with bronchitis at the beginning of  August. Patient still c\o cough.

## 2023-08-30 NOTE — DISCHARGE INSTRUCTIONS
Take medication as prescribed. Do not take cough medication while working or if you have to drive. Stay well hydrated. \"Delon taping\" your toes together can help discomfort. Elevate your foot. Apply ice pack for 10-15 minutes every 3-4 hours to your foot. Take Tylenol if needed for pain. Follow-up with your primary care provider. Return to the ER for any new, worsening, or concerning symptoms.

## 2023-09-01 LAB
CYTOLOGIST CVX/VAG CYTO: NORMAL
CYTOLOGY CVX/VAG DOC THIN PREP: NORMAL
HPV REFLEX: NORMAL
Lab: NORMAL
PATH REPORT.FINAL DX SPEC: NORMAL
STAT OF ADQ CVX/VAG CYTO-IMP: NORMAL

## 2023-09-06 ENCOUNTER — TELEPHONE (OUTPATIENT)
Dept: OBGYN CLINIC | Age: 58
End: 2023-09-06

## 2023-09-06 DIAGNOSIS — A59.9 TRICHOMONOSIS: Primary | ICD-10-CM

## 2023-09-06 RX ORDER — METRONIDAZOLE 500 MG/1
TABLET ORAL
Qty: 4 TABLET | Refills: 0 | Status: SHIPPED | OUTPATIENT
Start: 2023-09-06

## 2023-09-06 NOTE — PROGRESS NOTES
TAY Bridges is a 66 y.o. female seen for spotting. For the past couple of months she has had one day of very minimal spotting. She come in today for US. Past Medical History, Past Surgical History, Family history, Social History, and Medications were all reviewed with the patient today and updated as necessary. Current Outpatient Medications   Medication Sig    metroNIDAZOLE (FLAGYL) 500 MG tablet Take 2 grams x 1 dose. Cholecalciferol 50 MCG (2000 UT) TABS Take 1 tablet every day by oral route as directed for 90 days. famotidine (PEPCID) 40 MG tablet     omeprazole (PRILOSEC) 40 MG delayed release capsule 1 capsule in the morning and at bedtime    ondansetron (ZOFRAN-ODT) 4 MG disintegrating tablet     metFORMIN (GLUCOPHAGE-XR) 500 MG extended release tablet Take 2 tablets by mouth in the morning and at bedtime    atorvastatin (LIPITOR) 10 MG tablet Take 1 tablet by mouth daily    citalopram (CELEXA) 20 MG tablet Take 1 tablet by mouth daily    hydroCHLOROthiazide (HYDRODIURIL) 25 MG tablet Take 1 tablet by mouth daily    amLODIPine (NORVASC) 10 MG tablet Take 1 tablet by mouth daily    levothyroxine (SYNTHROID) 150 MCG tablet Take 1 tablet by mouth every morning (before breakfast)     No current facility-administered medications for this visit.      Allergies   Allergen Reactions    Latex Anaphylaxis    Kiwi Extract Shortness Of Breath    Fruita Flavor Shortness Of Breath    Shellfish Allergy Anaphylaxis    Yellow Dye Shortness Of Breath    Losartan Cough    Jardiance [Empagliflozin] Other (See Comments)     pancreatitis     Past Medical History:   Diagnosis Date    Acute non-recurrent maxillary sinusitis 12/26/2021    Acute pancreatitis without infection or necrosis 05/08/2023    Asthma     Chronic cough     Cough 12/26/2021    Diabetes ([address])     Endocrine disease     Hypothyroid    GERD (gastroesophageal reflux disease)     Hx of bronchitis     Hypertension 02/08/2021    Nasal congestion

## 2023-09-06 NOTE — TELEPHONE ENCOUNTER
Pt notified pap smear showed Trich and she will need to be treated as well as partner. Pt advised we will send in Flagyl 2 grams x 1 dose. Pt also advised to not drink any alcohol while taking this medication.  Pt voiced understanding

## 2023-09-07 ENCOUNTER — PROCEDURE VISIT (OUTPATIENT)
Dept: OBGYN CLINIC | Age: 58
End: 2023-09-07

## 2023-09-07 VITALS
DIASTOLIC BLOOD PRESSURE: 90 MMHG | HEIGHT: 67 IN | WEIGHT: 195 LBS | SYSTOLIC BLOOD PRESSURE: 146 MMHG | BODY MASS INDEX: 30.61 KG/M2

## 2023-09-07 DIAGNOSIS — N95.0 PMB (POSTMENOPAUSAL BLEEDING): Primary | ICD-10-CM

## 2023-09-07 DIAGNOSIS — N84.0 ENDOMETRIAL POLYP: ICD-10-CM

## 2023-09-07 DIAGNOSIS — R93.89 THICKENED ENDOMETRIUM: ICD-10-CM

## 2023-09-29 ENCOUNTER — OFFICE VISIT (OUTPATIENT)
Dept: PULMONOLOGY | Age: 58
End: 2023-09-29
Payer: COMMERCIAL

## 2023-09-29 VITALS
BODY MASS INDEX: 29.82 KG/M2 | DIASTOLIC BLOOD PRESSURE: 76 MMHG | RESPIRATION RATE: 18 BRPM | WEIGHT: 190 LBS | TEMPERATURE: 97.2 F | HEIGHT: 67 IN | SYSTOLIC BLOOD PRESSURE: 124 MMHG | OXYGEN SATURATION: 96 % | HEART RATE: 76 BPM

## 2023-09-29 DIAGNOSIS — J98.01 BRONCHOSPASM: Primary | ICD-10-CM

## 2023-09-29 DIAGNOSIS — Z87.09 HISTORY OF ASTHMA: ICD-10-CM

## 2023-09-29 DIAGNOSIS — K21.9 GASTROESOPHAGEAL REFLUX DISEASE, UNSPECIFIED WHETHER ESOPHAGITIS PRESENT: ICD-10-CM

## 2023-09-29 DIAGNOSIS — R05.3 CHRONIC COUGH: ICD-10-CM

## 2023-09-29 PROCEDURE — 3074F SYST BP LT 130 MM HG: CPT | Performed by: INTERNAL MEDICINE

## 2023-09-29 PROCEDURE — 3078F DIAST BP <80 MM HG: CPT | Performed by: INTERNAL MEDICINE

## 2023-09-29 PROCEDURE — 99213 OFFICE O/P EST LOW 20 MIN: CPT | Performed by: INTERNAL MEDICINE

## 2023-10-02 ENCOUNTER — OFFICE VISIT (OUTPATIENT)
Dept: FAMILY MEDICINE CLINIC | Facility: CLINIC | Age: 58
End: 2023-10-02
Payer: COMMERCIAL

## 2023-10-02 VITALS
SYSTOLIC BLOOD PRESSURE: 138 MMHG | WEIGHT: 193 LBS | TEMPERATURE: 97.8 F | DIASTOLIC BLOOD PRESSURE: 84 MMHG | HEIGHT: 67 IN | RESPIRATION RATE: 17 BRPM | HEART RATE: 72 BPM | BODY MASS INDEX: 30.29 KG/M2

## 2023-10-02 DIAGNOSIS — M54.42 ACUTE LEFT-SIDED LOW BACK PAIN WITH LEFT-SIDED SCIATICA: ICD-10-CM

## 2023-10-02 DIAGNOSIS — E11.65 TYPE 2 DIABETES MELLITUS WITH HYPERGLYCEMIA, WITHOUT LONG-TERM CURRENT USE OF INSULIN (HCC): Primary | ICD-10-CM

## 2023-10-02 DIAGNOSIS — E03.8 HYPOTHYROIDISM DUE TO HASHIMOTO'S THYROIDITIS: ICD-10-CM

## 2023-10-02 DIAGNOSIS — E06.3 HYPOTHYROIDISM DUE TO HASHIMOTO'S THYROIDITIS: ICD-10-CM

## 2023-10-02 DIAGNOSIS — I10 PRIMARY HYPERTENSION: Chronic | ICD-10-CM

## 2023-10-02 DIAGNOSIS — F32.A ANXIETY AND DEPRESSION: ICD-10-CM

## 2023-10-02 DIAGNOSIS — K21.9 GASTROESOPHAGEAL REFLUX DISEASE WITHOUT ESOPHAGITIS: ICD-10-CM

## 2023-10-02 DIAGNOSIS — F41.9 ANXIETY AND DEPRESSION: ICD-10-CM

## 2023-10-02 DIAGNOSIS — E78.2 MODERATE MIXED HYPERLIPIDEMIA NOT REQUIRING STATIN THERAPY: ICD-10-CM

## 2023-10-02 DIAGNOSIS — E50.9 VITAMIN A DEFICIENCY: ICD-10-CM

## 2023-10-02 DIAGNOSIS — Z59.9 FINANCIAL DIFFICULTIES: ICD-10-CM

## 2023-10-02 LAB
25(OH)D3 SERPL-MCNC: 23.5 NG/ML (ref 30–100)
ALBUMIN SERPL-MCNC: 3.7 G/DL (ref 3.5–5)
ALBUMIN/GLOB SERPL: 0.9 (ref 0.4–1.6)
ALP SERPL-CCNC: 57 U/L (ref 50–136)
ALT SERPL-CCNC: 28 U/L (ref 12–65)
ANION GAP SERPL CALC-SCNC: 5 MMOL/L (ref 2–11)
AST SERPL-CCNC: 13 U/L (ref 15–37)
BASOPHILS # BLD: 0 K/UL (ref 0–0.2)
BASOPHILS NFR BLD: 1 % (ref 0–2)
BILIRUB SERPL-MCNC: 0.3 MG/DL (ref 0.2–1.1)
BUN SERPL-MCNC: 15 MG/DL (ref 6–23)
CALCIUM SERPL-MCNC: 9.2 MG/DL (ref 8.3–10.4)
CHLORIDE SERPL-SCNC: 108 MMOL/L (ref 101–110)
CHOLEST SERPL-MCNC: 168 MG/DL
CO2 SERPL-SCNC: 28 MMOL/L (ref 21–32)
CREAT SERPL-MCNC: 0.8 MG/DL (ref 0.6–1)
DIFFERENTIAL METHOD BLD: ABNORMAL
EOSINOPHIL # BLD: 0.2 K/UL (ref 0–0.8)
EOSINOPHIL NFR BLD: 4 % (ref 0.5–7.8)
ERYTHROCYTE [DISTWIDTH] IN BLOOD BY AUTOMATED COUNT: 13.9 % (ref 11.9–14.6)
EST. AVERAGE GLUCOSE BLD GHB EST-MCNC: 180 MG/DL
GLOBULIN SER CALC-MCNC: 4 G/DL (ref 2.8–4.5)
GLUCOSE SERPL-MCNC: 143 MG/DL (ref 65–100)
HBA1C MFR BLD: 7.9 % (ref 4.8–5.6)
HCT VFR BLD AUTO: 35.1 % (ref 35.8–46.3)
HDLC SERPL-MCNC: 73 MG/DL (ref 40–60)
HDLC SERPL: 2.3
HGB BLD-MCNC: 11.1 G/DL (ref 11.7–15.4)
IMM GRANULOCYTES # BLD AUTO: 0 K/UL (ref 0–0.5)
IMM GRANULOCYTES NFR BLD AUTO: 0 % (ref 0–5)
LDLC SERPL CALC-MCNC: 85.8 MG/DL
LYMPHOCYTES # BLD: 1.5 K/UL (ref 0.5–4.6)
LYMPHOCYTES NFR BLD: 39 % (ref 13–44)
MCH RBC QN AUTO: 24.2 PG (ref 26.1–32.9)
MCHC RBC AUTO-ENTMCNC: 31.6 G/DL (ref 31.4–35)
MCV RBC AUTO: 76.6 FL (ref 82–102)
MONOCYTES # BLD: 0.3 K/UL (ref 0.1–1.3)
MONOCYTES NFR BLD: 8 % (ref 4–12)
NEUTS SEG # BLD: 1.8 K/UL (ref 1.7–8.2)
NEUTS SEG NFR BLD: 48 % (ref 43–78)
NRBC # BLD: 0 K/UL (ref 0–0.2)
PLATELET # BLD AUTO: 263 K/UL (ref 150–450)
PMV BLD AUTO: 11 FL (ref 9.4–12.3)
POTASSIUM SERPL-SCNC: 4.2 MMOL/L (ref 3.5–5.1)
PROT SERPL-MCNC: 7.7 G/DL (ref 6.3–8.2)
RBC # BLD AUTO: 4.58 M/UL (ref 4.05–5.2)
SODIUM SERPL-SCNC: 141 MMOL/L (ref 133–143)
T4 FREE SERPL-MCNC: 1 NG/DL (ref 0.78–1.46)
TRIGL SERPL-MCNC: 46 MG/DL (ref 35–150)
TSH, 3RD GENERATION: 4.64 UIU/ML (ref 0.36–3.74)
VLDLC SERPL CALC-MCNC: 9.2 MG/DL (ref 6–23)
WBC # BLD AUTO: 3.8 K/UL (ref 4.3–11.1)

## 2023-10-02 PROCEDURE — 99215 OFFICE O/P EST HI 40 MIN: CPT

## 2023-10-02 PROCEDURE — 3079F DIAST BP 80-89 MM HG: CPT

## 2023-10-02 PROCEDURE — 3052F HG A1C>EQUAL 8.0%<EQUAL 9.0%: CPT

## 2023-10-02 PROCEDURE — 3075F SYST BP GE 130 - 139MM HG: CPT

## 2023-10-02 RX ORDER — METFORMIN HYDROCHLORIDE 500 MG/1
1000 TABLET, EXTENDED RELEASE ORAL 2 TIMES DAILY
Qty: 120 TABLET | Refills: 5 | Status: SHIPPED | OUTPATIENT
Start: 2023-10-02

## 2023-10-02 RX ORDER — OMEPRAZOLE 40 MG/1
40 CAPSULE, DELAYED RELEASE ORAL 2 TIMES DAILY
Qty: 120 CAPSULE | Refills: 5 | Status: SHIPPED | OUTPATIENT
Start: 2023-10-02

## 2023-10-02 RX ORDER — CITALOPRAM 20 MG/1
20 TABLET ORAL DAILY
Qty: 60 TABLET | Refills: 5 | Status: SHIPPED | OUTPATIENT
Start: 2023-10-02

## 2023-10-02 RX ORDER — AMLODIPINE BESYLATE 10 MG/1
10 TABLET ORAL DAILY
Qty: 60 TABLET | Refills: 5 | Status: SHIPPED | OUTPATIENT
Start: 2023-10-02

## 2023-10-02 RX ORDER — LEVOTHYROXINE SODIUM 0.15 MG/1
150 TABLET ORAL
Qty: 60 TABLET | Refills: 5 | Status: SHIPPED | OUTPATIENT
Start: 2023-10-02 | End: 2023-10-03 | Stop reason: DRUGHIGH

## 2023-10-02 RX ORDER — HYDROCHLOROTHIAZIDE 25 MG/1
25 TABLET ORAL DAILY
Qty: 60 TABLET | Refills: 5 | Status: SHIPPED | OUTPATIENT
Start: 2023-10-02

## 2023-10-02 RX ORDER — FAMOTIDINE 40 MG/1
40 TABLET, FILM COATED ORAL NIGHTLY PRN
Qty: 60 TABLET | Refills: 5 | Status: SHIPPED | OUTPATIENT
Start: 2023-10-02

## 2023-10-02 RX ORDER — ONDANSETRON 4 MG/1
4 TABLET, ORALLY DISINTEGRATING ORAL EVERY 8 HOURS PRN
Qty: 30 TABLET | Refills: 5 | Status: SHIPPED | OUTPATIENT
Start: 2023-10-02

## 2023-10-02 SDOH — ECONOMIC STABILITY - INCOME SECURITY: PROBLEM RELATED TO HOUSING AND ECONOMIC CIRCUMSTANCES, UNSPECIFIED: Z59.9

## 2023-10-02 ASSESSMENT — ENCOUNTER SYMPTOMS
RESPIRATORY NEGATIVE: 1
GASTROINTESTINAL NEGATIVE: 1
EYES NEGATIVE: 1
ALLERGIC/IMMUNOLOGIC NEGATIVE: 1

## 2023-10-02 NOTE — PROGRESS NOTES
breakfast)  -     T4, Free  -     TSH    Moderate mixed hyperlipidemia not requiring statin therapy  -     Lipid Panel; Future  -     Lipid Panel    Vitamin A deficiency  -     Vitamin D 25 Hydroxy; Future  -     Vitamin D 25 Hydroxy    Gastroesophageal reflux disease without esophagitis  -     famotidine (PEPCID) 40 MG tablet; Take 1 tablet by mouth nightly as needed (Reflux)  -     omeprazole (PRILOSEC) 40 MG delayed release capsule; Take 1 capsule by mouth in the morning and at bedtime  -     ondansetron (ZOFRAN-ODT) 4 MG disintegrating tablet; Take 1 tablet by mouth every 8 hours as needed for Nausea or Vomiting  -    stable     Anxiety and depression  -     citalopram (CELEXA) 20 MG tablet; Take 1 tablet by mouth daily  -      stable    Financial difficulties  -     [facility] - Care Coordination/Social Work - [facility] Care Management    Acute left-sided low back pain with left-sided sciatica         -   I want you to be on a drug holiday of the statin maybe that is what is causing your pain I want to see what happens when you suspend it for 2 weeks and come back to see me. Make sure you are eating low carb, low sugar, high lean protein and trying to exercise for atleast 30 min, 5 times per week. No results found for any visits on 10/02/23. Return in about 2 weeks (around 10/16/2023) for Left sided pain (stopping statin) . On this date 10/2/2023 I have spent 40 minutes reviewing previous notes, test results and face to face with the patient discussing the diagnosis and importance of compliance with the treatment plan as well as documenting on the day of the visit.     Signed By: ELIZABETH Walker NP     October 2, 2023

## 2023-10-03 DIAGNOSIS — D50.8 IRON DEFICIENCY ANEMIA SECONDARY TO INADEQUATE DIETARY IRON INTAKE: ICD-10-CM

## 2023-10-03 DIAGNOSIS — E06.3 HYPOTHYROIDISM DUE TO HASHIMOTO'S THYROIDITIS: ICD-10-CM

## 2023-10-03 DIAGNOSIS — E55.9 VITAMIN D DEFICIENCY: Primary | ICD-10-CM

## 2023-10-03 DIAGNOSIS — E03.8 HYPOTHYROIDISM DUE TO HASHIMOTO'S THYROIDITIS: ICD-10-CM

## 2023-10-03 RX ORDER — LEVOTHYROXINE SODIUM 175 UG/1
175 TABLET ORAL DAILY
Qty: 30 TABLET | Refills: 3 | Status: SHIPPED | OUTPATIENT
Start: 2023-10-03

## 2023-10-04 ENCOUNTER — CARE COORDINATION (OUTPATIENT)
Dept: CARE COORDINATION | Facility: CLINIC | Age: 58
End: 2023-10-04

## 2023-10-05 ENCOUNTER — CARE COORDINATION (OUTPATIENT)
Dept: CARE COORDINATION | Facility: CLINIC | Age: 58
End: 2023-10-05

## 2023-10-09 ENCOUNTER — CARE COORDINATION (OUTPATIENT)
Dept: CARE COORDINATION | Facility: CLINIC | Age: 58
End: 2023-10-09

## 2023-10-09 NOTE — CARE COORDINATION
VM left with pt regarding referral from PCP. If no call back is received within 5 business days, referral will be closed.

## 2023-10-17 ENCOUNTER — OFFICE VISIT (OUTPATIENT)
Dept: FAMILY MEDICINE CLINIC | Facility: CLINIC | Age: 58
End: 2023-10-17
Payer: COMMERCIAL

## 2023-10-17 ENCOUNTER — TELEPHONE (OUTPATIENT)
Dept: FAMILY MEDICINE CLINIC | Facility: CLINIC | Age: 58
End: 2023-10-17

## 2023-10-17 ENCOUNTER — CARE COORDINATION (OUTPATIENT)
Dept: CARE COORDINATION | Facility: CLINIC | Age: 58
End: 2023-10-17

## 2023-10-17 VITALS
WEIGHT: 190 LBS | SYSTOLIC BLOOD PRESSURE: 124 MMHG | BODY MASS INDEX: 29.82 KG/M2 | HEIGHT: 67 IN | HEART RATE: 72 BPM | DIASTOLIC BLOOD PRESSURE: 76 MMHG | RESPIRATION RATE: 17 BRPM | TEMPERATURE: 97.6 F

## 2023-10-17 DIAGNOSIS — M79.7 MUSCLE PAIN, FIBROMYALGIA: Primary | ICD-10-CM

## 2023-10-17 PROCEDURE — 99213 OFFICE O/P EST LOW 20 MIN: CPT

## 2023-10-17 PROCEDURE — 3078F DIAST BP <80 MM HG: CPT

## 2023-10-17 PROCEDURE — 3074F SYST BP LT 130 MM HG: CPT

## 2023-10-17 RX ORDER — PREGABALIN 25 MG/1
25 CAPSULE ORAL 2 TIMES DAILY
Qty: 60 CAPSULE | Refills: 1 | Status: SHIPPED | OUTPATIENT
Start: 2023-10-17 | End: 2023-12-16

## 2023-10-17 ASSESSMENT — ENCOUNTER SYMPTOMS
ALLERGIC/IMMUNOLOGIC NEGATIVE: 1
GASTROINTESTINAL NEGATIVE: 1
RESPIRATORY NEGATIVE: 1
EYES NEGATIVE: 1

## 2023-10-17 NOTE — PROGRESS NOTES
Tomie Belt Tomie Belt Tomie Belt .... Subjective:     Abbi Bridges 1965 is a 66 y.o. female Established patient, here for evaluation of the following:   Chief Complaint   Patient presents with    Follow-up     Left sided pain--she still has it        Ms. Harleen Elizondo presents to the clinic after a 2 week drug holiday of statin to see if it helped her left sided pain. She states it did not. Patient has fibromyalgia and was taking lyrica in the past and she was doing so well on it. She would like to restart it as she works at clinic in memory care and the pain has began to effect her work. Denies SOB, Chest pain. Past Medical History:   Diagnosis Date    Acute non-recurrent maxillary sinusitis 12/26/2021    Acute pancreatitis without infection or necrosis 05/08/2023    Asthma     Chronic cough     Cough 12/26/2021    Diabetes ([address])     Endocrine disease     Hypothyroid    GERD (gastroesophageal reflux disease)     Hx of bronchitis     Hypertension 02/08/2021    Nasal congestion 12/26/2021    Other ill-defined conditions(799.89)     Fibromyalgia    UTI (urinary tract infection)      Past Surgical History:   Procedure Laterality Date    GYN      Tubal      Family History   Problem Relation Age of Onset    Cancer Mother         stomach    High Blood Pressure Mother     Heart Failure Father     High Blood Pressure Father     Gall Bladder Disease Father      Social History     Tobacco Use    Smoking status: Never    Smokeless tobacco: Never   Substance Use Topics    Alcohol use: Yes     Comment: occ      Current Outpatient Medications   Medication Sig Dispense Refill    pregabalin (LYRICA) 25 MG capsule Take 1 capsule by mouth 2 times daily for 60 days. Max Daily Amount: 50 mg 60 capsule 1    Cholecalciferol 50 MCG (2000 UT) TABS Take 1 tablet every day by oral route as directed for 90 days.  30 tablet 5    levothyroxine (SYNTHROID) 175 MCG tablet Take 1 tablet by mouth daily 30 tablet 3    metFORMIN (GLUCOPHAGE-XR) 500 MG extended release

## 2023-10-17 NOTE — TELEPHONE ENCOUNTER
Left a message that she hasn't seen her lab results on my chart and ill mail a copy to her today    Gerhardt Broom, APRN - NP  looked over your labs     A1c is still above 7.5% so either you need to religiously work on your diet stop consuming sweets, sugar, eat low carb, high lean protein. You also need to try and get in at least 30 min of exercise, 5 times per week. Vitamin D is low, I have refilled your vitamin D supplement. Your TSH is high so I will need to increase your levothyroxine to 175 mcg, you need to start taking this ASAP and then make an appointment to have TSH redrawn in 6 weeks. T4 good. Lipids look great, since we have put you on the drug holiday please make surey ou are watching your diet. CMP: all good except for elevated glucose which goes with the elevated A1c.     CBC You are definitely anemic, I would recommend you start taking an OTC iron supplement and increase intake of iron rich foods. Will recheck with thyroid panel.

## 2023-11-14 ENCOUNTER — OFFICE VISIT (OUTPATIENT)
Dept: FAMILY MEDICINE CLINIC | Facility: CLINIC | Age: 58
End: 2023-11-14
Payer: COMMERCIAL

## 2023-11-14 VITALS
TEMPERATURE: 97.6 F | BODY MASS INDEX: 29.51 KG/M2 | WEIGHT: 188 LBS | HEIGHT: 67 IN | SYSTOLIC BLOOD PRESSURE: 138 MMHG | DIASTOLIC BLOOD PRESSURE: 86 MMHG | OXYGEN SATURATION: 96 % | RESPIRATION RATE: 17 BRPM | HEART RATE: 80 BPM

## 2023-11-14 DIAGNOSIS — M79.7 MUSCLE PAIN, FIBROMYALGIA: Primary | ICD-10-CM

## 2023-11-14 DIAGNOSIS — D50.8 IRON DEFICIENCY ANEMIA SECONDARY TO INADEQUATE DIETARY IRON INTAKE: ICD-10-CM

## 2023-11-14 DIAGNOSIS — E03.8 HYPOTHYROIDISM DUE TO HASHIMOTO'S THYROIDITIS: ICD-10-CM

## 2023-11-14 DIAGNOSIS — E06.3 HYPOTHYROIDISM DUE TO HASHIMOTO'S THYROIDITIS: ICD-10-CM

## 2023-11-14 DIAGNOSIS — E11.65 TYPE 2 DIABETES MELLITUS WITH HYPERGLYCEMIA, WITHOUT LONG-TERM CURRENT USE OF INSULIN (HCC): ICD-10-CM

## 2023-11-14 LAB
ALBUMIN, URINE, POC: 30 MG/L
BASOPHILS # BLD: 0 K/UL (ref 0–0.2)
BASOPHILS NFR BLD: 0 % (ref 0–2)
CREATININE, URINE, POC: 100 MG/DL
DIFFERENTIAL METHOD BLD: ABNORMAL
EOSINOPHIL # BLD: 0.1 K/UL (ref 0–0.8)
EOSINOPHIL NFR BLD: 3 % (ref 0.5–7.8)
ERYTHROCYTE [DISTWIDTH] IN BLOOD BY AUTOMATED COUNT: 13.8 % (ref 11.9–14.6)
FERRITIN SERPL-MCNC: 31 NG/ML (ref 8–388)
HCT VFR BLD AUTO: 34.2 % (ref 35.8–46.3)
HGB BLD-MCNC: 11.2 G/DL (ref 11.7–15.4)
IMM GRANULOCYTES # BLD AUTO: 0 K/UL (ref 0–0.5)
IMM GRANULOCYTES NFR BLD AUTO: 0 % (ref 0–5)
LYMPHOCYTES # BLD: 1.3 K/UL (ref 0.5–4.6)
LYMPHOCYTES NFR BLD: 30 % (ref 13–44)
MCH RBC QN AUTO: 23.9 PG (ref 26.1–32.9)
MCHC RBC AUTO-ENTMCNC: 32.7 G/DL (ref 31.4–35)
MCV RBC AUTO: 73.1 FL (ref 82–102)
MICROALB/CREAT RATIO, POC: <30 MG/G
MONOCYTES # BLD: 0.4 K/UL (ref 0.1–1.3)
MONOCYTES NFR BLD: 9 % (ref 4–12)
NEUTS SEG # BLD: 2.4 K/UL (ref 1.7–8.2)
NEUTS SEG NFR BLD: 58 % (ref 43–78)
NRBC # BLD: 0 K/UL (ref 0–0.2)
PLATELET # BLD AUTO: 270 K/UL (ref 150–450)
PMV BLD AUTO: 10.7 FL (ref 9.4–12.3)
RBC # BLD AUTO: 4.68 M/UL (ref 4.05–5.2)
TSH, 3RD GENERATION: 0.06 UIU/ML (ref 0.36–3.74)
VIT B12 SERPL-MCNC: 488 PG/ML (ref 193–986)
WBC # BLD AUTO: 4.1 K/UL (ref 4.3–11.1)

## 2023-11-14 PROCEDURE — 3051F HG A1C>EQUAL 7.0%<8.0%: CPT

## 2023-11-14 PROCEDURE — 3075F SYST BP GE 130 - 139MM HG: CPT

## 2023-11-14 PROCEDURE — 82044 UR ALBUMIN SEMIQUANTITATIVE: CPT

## 2023-11-14 PROCEDURE — 99214 OFFICE O/P EST MOD 30 MIN: CPT

## 2023-11-14 PROCEDURE — 3079F DIAST BP 80-89 MM HG: CPT

## 2023-11-14 RX ORDER — PREGABALIN 25 MG/1
25 CAPSULE ORAL 3 TIMES DAILY
Qty: 90 CAPSULE | Refills: 2 | Status: SHIPPED | OUTPATIENT
Start: 2023-11-14 | End: 2024-02-12

## 2023-11-14 ASSESSMENT — ENCOUNTER SYMPTOMS
ALLERGIC/IMMUNOLOGIC NEGATIVE: 1
GASTROINTESTINAL NEGATIVE: 1
EYES NEGATIVE: 1
RESPIRATORY NEGATIVE: 1

## 2023-11-14 NOTE — PROGRESS NOTES
Art Whitney .... Subjective:     Marilu Navas 1965 is a 66 y.o. female Established patient, here for evaluation of the following:   Chief Complaint   Patient presents with    Follow-up     4 week for fibromyalgia        Chet Ortiz presents to the clinic for a follow up after restarting lyrica for fibromylagia, she has currently been taking 25mg lyrica bid, she was previously on 150mg. She states that it has helped but not completely. I will increase to 25mg TID, did review with her the importance of not going up too high as this is a controlled substance and can cause dependence. We will redraw TSH and CBC today. She is working on her diet to lower A1c. She states that right now her pain in on her right side more than left. Past Medical History:   Diagnosis Date    Acute non-recurrent maxillary sinusitis 12/26/2021    Acute pancreatitis without infection or necrosis 05/08/2023    Asthma     Chronic cough     Cough 12/26/2021    Diabetes ([address])     Endocrine disease     Hypothyroid    GERD (gastroesophageal reflux disease)     Hx of bronchitis     Hypertension 02/08/2021    Nasal congestion 12/26/2021    Other ill-defined conditions(799.89)     Fibromyalgia    UTI (urinary tract infection)      Past Surgical History:   Procedure Laterality Date    GYN      Tubal      Family History   Problem Relation Age of Onset    Cancer Mother         stomach    High Blood Pressure Mother     Heart Failure Father     High Blood Pressure Father     Gall Bladder Disease Father      Social History     Tobacco Use    Smoking status: Never    Smokeless tobacco: Never   Substance Use Topics    Alcohol use: Yes     Comment: occ      Current Outpatient Medications   Medication Sig Dispense Refill    pregabalin (LYRICA) 25 MG capsule Take 1 capsule by mouth 3 times daily for 90 days. Max Daily Amount: 75 mg 90 capsule 2    Cholecalciferol 50 MCG (2000 UT) TABS Take 1 tablet every day by oral route as directed for 90 days.  30 tablet

## 2023-11-15 DIAGNOSIS — E03.8 HYPOTHYROIDISM DUE TO HASHIMOTO'S THYROIDITIS: Primary | ICD-10-CM

## 2023-11-15 DIAGNOSIS — E06.3 HYPOTHYROIDISM DUE TO HASHIMOTO'S THYROIDITIS: Primary | ICD-10-CM

## 2023-11-15 RX ORDER — LEVOTHYROXINE SODIUM 0.15 MG/1
150 TABLET ORAL DAILY
Qty: 30 TABLET | Refills: 5 | Status: SHIPPED | OUTPATIENT
Start: 2023-11-15

## 2023-12-06 ENCOUNTER — HOSPITAL ENCOUNTER (EMERGENCY)
Age: 58
Discharge: HOME | End: 2023-12-06
Payer: COMMERCIAL

## 2023-12-06 ENCOUNTER — APPOINTMENT (OUTPATIENT)
Dept: GENERAL RADIOLOGY | Age: 58
End: 2023-12-06
Payer: COMMERCIAL

## 2023-12-06 VITALS
DIASTOLIC BLOOD PRESSURE: 95 MMHG | SYSTOLIC BLOOD PRESSURE: 139 MMHG | HEIGHT: 67 IN | RESPIRATION RATE: 17 BRPM | OXYGEN SATURATION: 98 % | HEART RATE: 76 BPM | BODY MASS INDEX: 28.88 KG/M2 | WEIGHT: 184 LBS | TEMPERATURE: 98.3 F

## 2023-12-06 DIAGNOSIS — J40 BRONCHITIS: Primary | ICD-10-CM

## 2023-12-06 DIAGNOSIS — R05.8 OTHER COUGH: ICD-10-CM

## 2023-12-06 PROCEDURE — 99283 EMERGENCY DEPT VISIT LOW MDM: CPT

## 2023-12-06 PROCEDURE — 71046 X-RAY EXAM CHEST 2 VIEWS: CPT

## 2023-12-06 ASSESSMENT — PAIN DESCRIPTION - FREQUENCY: FREQUENCY: INTERMITTENT

## 2023-12-06 ASSESSMENT — PAIN DESCRIPTION - PAIN TYPE: TYPE: ACUTE PAIN

## 2023-12-06 ASSESSMENT — PAIN SCALES - GENERAL: PAINLEVEL_OUTOF10: 1

## 2023-12-06 ASSESSMENT — PAIN - FUNCTIONAL ASSESSMENT: PAIN_FUNCTIONAL_ASSESSMENT: 0-10

## 2023-12-06 ASSESSMENT — PAIN DESCRIPTION - DESCRIPTORS: DESCRIPTORS: ACHING

## 2023-12-06 ASSESSMENT — PAIN DESCRIPTION - LOCATION: LOCATION: CHEST

## 2023-12-06 NOTE — ED TRIAGE NOTES
Pt reports cough x2wks with some chest discomfort only when coughs. Pt tested negative for covid and flu yesterday.    (-)fever    A&Ox4

## 2023-12-06 NOTE — DISCHARGE INSTRUCTIONS
Your chest x-ray shows no signs of pneumonia or infection. You most likely have a viral bronchitis infection contributing to your symptoms. Take over-the-counter medications such as Mucinex, Delsym, or Robitussin as needed for cough relief. Make sure you drink a lot of water if taking Mucinex of this medication works better. Follow-up with your primary care provider as scheduled. Return with any new or worsening symptoms.

## 2023-12-06 NOTE — ED PROVIDER NOTES
Emergency Department Provider Note       PCP: Shreya Recio APRN - NP   Age: 66 y.o. Sex: female     DISPOSITION Decision To Discharge 12/06/2023 02:39:27 PM       ICD-10-CM    1. Bronchitis  J40       2. Other cough  R05.8           Medical Decision Making     Complexity of Problems Addressed:  Complexity of Problem: 1 acute, uncomplicated illness or injury. Data Reviewed and Analyzed:  I independently ordered and reviewed each unique test.         I interpreted the X-rays. No acute findings per radiologist    Discussion of management or test interpretation. Presented with concerns of dry persistent cough for the last 2 weeks. Negative for flu and COVID-19 yesterday. Negative chest x-ray imaging today with no signs of pneumonia. Suspicion for acute bronchitis. Advised conservative outpatient symptomatic management. Does have a history of GERD and given that per her symptoms that her cough is worse at night, suspicion that a portion of her cough symptoms may be due to uncontrolled GERD. Advised close follow-up with PCP regarding management of this. Risk of Complications and/or Morbidity of Patient Management:  OTC drug management performed and Shared medical decision making was utilized in creating the patients health plan today. History      66 old female with past medical history of asthma and GERD presenting to emergency department with concern for dry persistent cough for the last 2 weeks. Has tested negative for flu and COVID-19 yesterday. Reports that she has had some form of this cough since 2020 per patient. Reports cough to be dry and nonproductive. Denies fevers and chills. Reports she is compliant with her GERD medication regimen and has been taking Hycodan intermittently as prescribed previously for cough relief. Upon my evaluation she is in no acute distress. The history is provided by the patient.         Physical Exam     Vitals signs and nursing note

## 2023-12-07 ENCOUNTER — CARE COORDINATION (OUTPATIENT)
Dept: CARE COORDINATION | Facility: CLINIC | Age: 58
End: 2023-12-07

## 2023-12-07 NOTE — CARE COORDINATION
ACM discussed ED RONY for Dry cough x2wks, dx bronchitis, no fever, no rx given. F/u w/ PCP felisa. Discussed CCM services, role of ACM pt declines services at this time.

## 2023-12-12 ENCOUNTER — OFFICE VISIT (OUTPATIENT)
Dept: FAMILY MEDICINE CLINIC | Facility: CLINIC | Age: 58
End: 2023-12-12
Payer: COMMERCIAL

## 2023-12-12 VITALS
HEIGHT: 67 IN | WEIGHT: 189 LBS | HEART RATE: 86 BPM | RESPIRATION RATE: 17 BRPM | SYSTOLIC BLOOD PRESSURE: 136 MMHG | TEMPERATURE: 98.2 F | BODY MASS INDEX: 29.66 KG/M2 | OXYGEN SATURATION: 97 % | DIASTOLIC BLOOD PRESSURE: 84 MMHG

## 2023-12-12 DIAGNOSIS — E03.8 HYPOTHYROIDISM DUE TO HASHIMOTO'S THYROIDITIS: Primary | ICD-10-CM

## 2023-12-12 DIAGNOSIS — M79.7 MUSCLE PAIN, FIBROMYALGIA: ICD-10-CM

## 2023-12-12 DIAGNOSIS — E06.3 HYPOTHYROIDISM DUE TO HASHIMOTO'S THYROIDITIS: ICD-10-CM

## 2023-12-12 DIAGNOSIS — J40 BRONCHITIS: Primary | ICD-10-CM

## 2023-12-12 DIAGNOSIS — E03.8 HYPOTHYROIDISM DUE TO HASHIMOTO'S THYROIDITIS: ICD-10-CM

## 2023-12-12 DIAGNOSIS — E06.3 HYPOTHYROIDISM DUE TO HASHIMOTO'S THYROIDITIS: Primary | ICD-10-CM

## 2023-12-12 LAB — TSH, 3RD GENERATION: 0.16 UIU/ML (ref 0.36–3.74)

## 2023-12-12 PROCEDURE — 99213 OFFICE O/P EST LOW 20 MIN: CPT

## 2023-12-12 PROCEDURE — 3079F DIAST BP 80-89 MM HG: CPT

## 2023-12-12 PROCEDURE — 3075F SYST BP GE 130 - 139MM HG: CPT

## 2023-12-12 RX ORDER — LEVOTHYROXINE SODIUM 0.12 MG/1
125 TABLET ORAL DAILY
Qty: 60 TABLET | Refills: 1 | Status: SHIPPED | OUTPATIENT
Start: 2023-12-12

## 2023-12-12 RX ORDER — AZITHROMYCIN 250 MG/1
250 TABLET, FILM COATED ORAL SEE ADMIN INSTRUCTIONS
Qty: 6 TABLET | Refills: 0 | Status: SHIPPED | OUTPATIENT
Start: 2023-12-12 | End: 2023-12-17

## 2023-12-12 ASSESSMENT — ENCOUNTER SYMPTOMS
COUGH: 1
SORE THROAT: 1
ALLERGIC/IMMUNOLOGIC NEGATIVE: 1
EYES NEGATIVE: 1
SHORTNESS OF BREATH: 1

## 2023-12-12 NOTE — PROGRESS NOTES
Wali Numbers Clegrecia Numbers Clegrecia Numbers .... Subjective:     Curry Jackson 1965 is a 66 y.o. female Established patient, here for evaluation of the following:   Chief Complaint   Patient presents with    Follow-up      increasing the lyrica dose has helped       Jelani Michelle presents to the clinic for follow up of increasing Lyrica, she states that it has helped. She has been having a cough for about 2 weeks, she states its been slightly productive, white. Denies fever, she states its sore throat, slight SOB, slight pain in the chest because of the cough. She went to the ER as she stated that she was not given anything and the MD did not even listen to her. She feels like she is getting worse as the days progress. She has tried genesis-seltzer plus and she states it has helped a little. She was given tessalon perles but she states does not helped much.          Past Medical History:   Diagnosis Date    Acute non-recurrent maxillary sinusitis 12/26/2021    Acute pancreatitis without infection or necrosis 05/08/2023    Asthma     Chronic cough     Cough 12/26/2021    Diabetes ([address])     Endocrine disease     Hypothyroid    GERD (gastroesophageal reflux disease)     Hx of bronchitis     Hypertension 02/08/2021    Nasal congestion 12/26/2021    Other ill-defined conditions(799.89)     Fibromyalgia    UTI (urinary tract infection)      Past Surgical History:   Procedure Laterality Date    GYN      Tubal      Family History   Problem Relation Age of Onset    Cancer Mother         stomach    High Blood Pressure Mother     Heart Failure Father     High Blood Pressure Father     Gall Bladder Disease Father      Social History     Tobacco Use    Smoking status: Never    Smokeless tobacco: Never   Substance Use Topics    Alcohol use: Yes     Comment: occ      Current Outpatient Medications   Medication Sig Dispense Refill    azithromycin (ZITHROMAX) 250 MG tablet Take 1 tablet by mouth See Admin Instructions for 5 days 500mg on day 1 followed by 250mg on

## 2024-01-22 ENCOUNTER — NURSE ONLY (OUTPATIENT)
Dept: FAMILY MEDICINE CLINIC | Facility: CLINIC | Age: 59
End: 2024-01-22

## 2024-01-22 DIAGNOSIS — E06.3 HYPOTHYROIDISM DUE TO HASHIMOTO'S THYROIDITIS: ICD-10-CM

## 2024-01-22 LAB — TSH, 3RD GENERATION: 0.29 UIU/ML (ref 0.36–3.74)

## 2024-01-23 DIAGNOSIS — E03.8 HYPOTHYROIDISM DUE TO HASHIMOTO'S THYROIDITIS: Primary | ICD-10-CM

## 2024-01-23 DIAGNOSIS — E06.3 HYPOTHYROIDISM DUE TO HASHIMOTO'S THYROIDITIS: Primary | ICD-10-CM

## 2024-01-23 RX ORDER — LEVOTHYROXINE SODIUM 112 UG/1
112 TABLET ORAL DAILY
Qty: 30 TABLET | Refills: 0 | Status: SHIPPED | OUTPATIENT
Start: 2024-01-23 | End: 2024-01-23

## 2024-01-23 RX ORDER — LEVOTHYROXINE SODIUM 112 UG/1
112 TABLET ORAL DAILY
Qty: 60 TABLET | Refills: 1 | Status: SHIPPED | OUTPATIENT
Start: 2024-01-23

## 2024-02-16 ENCOUNTER — OFFICE VISIT (OUTPATIENT)
Dept: FAMILY MEDICINE CLINIC | Facility: CLINIC | Age: 59
End: 2024-02-16
Payer: COMMERCIAL

## 2024-02-16 VITALS
HEART RATE: 76 BPM | TEMPERATURE: 97.7 F | RESPIRATION RATE: 17 BRPM | SYSTOLIC BLOOD PRESSURE: 136 MMHG | WEIGHT: 191 LBS | OXYGEN SATURATION: 97 % | HEIGHT: 67 IN | BODY MASS INDEX: 29.98 KG/M2 | DIASTOLIC BLOOD PRESSURE: 78 MMHG

## 2024-02-16 DIAGNOSIS — M79.7 MUSCLE PAIN, FIBROMYALGIA: ICD-10-CM

## 2024-02-16 DIAGNOSIS — E55.9 VITAMIN D DEFICIENCY: ICD-10-CM

## 2024-02-16 DIAGNOSIS — E11.65 TYPE 2 DIABETES MELLITUS WITH HYPERGLYCEMIA, WITHOUT LONG-TERM CURRENT USE OF INSULIN (HCC): ICD-10-CM

## 2024-02-16 DIAGNOSIS — E11.69 TYPE 2 DIABETES MELLITUS WITH OTHER SPECIFIED COMPLICATION, WITH LONG-TERM CURRENT USE OF INSULIN (HCC): ICD-10-CM

## 2024-02-16 DIAGNOSIS — Z79.4 TYPE 2 DIABETES MELLITUS WITH OTHER SPECIFIED COMPLICATION, WITH LONG-TERM CURRENT USE OF INSULIN (HCC): ICD-10-CM

## 2024-02-16 DIAGNOSIS — F33.1 MODERATE EPISODE OF RECURRENT MAJOR DEPRESSIVE DISORDER (HCC): ICD-10-CM

## 2024-02-16 DIAGNOSIS — K21.9 GASTROESOPHAGEAL REFLUX DISEASE WITHOUT ESOPHAGITIS: Primary | ICD-10-CM

## 2024-02-16 DIAGNOSIS — E78.2 MIXED HYPERLIPIDEMIA: ICD-10-CM

## 2024-02-16 DIAGNOSIS — I10 PRIMARY HYPERTENSION: ICD-10-CM

## 2024-02-16 PROCEDURE — 99214 OFFICE O/P EST MOD 30 MIN: CPT

## 2024-02-16 PROCEDURE — 3075F SYST BP GE 130 - 139MM HG: CPT

## 2024-02-16 PROCEDURE — 3078F DIAST BP <80 MM HG: CPT

## 2024-02-16 RX ORDER — PREGABALIN 25 MG/1
25 CAPSULE ORAL 3 TIMES DAILY
Qty: 90 CAPSULE | Refills: 2 | Status: SHIPPED | OUTPATIENT
Start: 2024-02-16 | End: 2024-05-16

## 2024-02-16 RX ORDER — PANTOPRAZOLE SODIUM 40 MG/1
40 TABLET, DELAYED RELEASE ORAL
Qty: 30 TABLET | Refills: 5 | Status: SHIPPED | OUTPATIENT
Start: 2024-02-16

## 2024-02-16 NOTE — PROGRESS NOTES
.......    Subjective:     Jaimee Tate 1965 is a 58 y.o. female Established patient, here for evaluation of the following:   Chief Complaint   Patient presents with    Follow-up     3 month chronic care    Other     Can she get something to help with weight loss       Ms. Hermosillo presents to the clinic for a 3 month chronic health care visit. She right now does not have any complaints except for the fact that she has gained weight but she knows why she is gaining weight. She has been eating a lot of sweets as well as a lot of bread. Told her that because of her past history with pancreatitis she is not a candidate for a GLP-1, I want her to really try control her weight with diet and exercise first before we talk about any medication. She voiced understanding.         Past Medical History:   Diagnosis Date    Acute non-recurrent maxillary sinusitis 12/26/2021    Acute pancreatitis without infection or necrosis 05/08/2023    Asthma     Chronic cough     Cough 12/26/2021    Diabetes ([facility])     Endocrine disease     Hypothyroid    GERD (gastroesophageal reflux disease)     Hx of bronchitis     Hypertension 02/08/2021    Nasal congestion 12/26/2021    Other ill-defined conditions(799.89)     Fibromyalgia    UTI (urinary tract infection)      Past Surgical History:   Procedure Laterality Date    GYN      Tubal      Family History   Problem Relation Age of Onset    Cancer Mother         stomach    High Blood Pressure Mother     Heart Failure Father     High Blood Pressure Father     Gall Bladder Disease Father      Social History     Tobacco Use    Smoking status: Never    Smokeless tobacco: Never   Substance Use Topics    Alcohol use: Yes     Comment: occ      Current Outpatient Medications   Medication Sig Dispense Refill    pregabalin (LYRICA) 25 MG capsule Take 1 capsule by mouth 3 times daily for 90 days. Max Daily Amount: 75 mg 90 capsule 2    pantoprazole (PROTONIX) 40 MG tablet Take 1 tablet by mouth every

## 2024-03-08 ENCOUNTER — NURSE ONLY (OUTPATIENT)
Dept: FAMILY MEDICINE CLINIC | Facility: CLINIC | Age: 59
End: 2024-03-08

## 2024-03-08 DIAGNOSIS — E06.3 HYPOTHYROIDISM DUE TO HASHIMOTO'S THYROIDITIS: ICD-10-CM

## 2024-03-08 DIAGNOSIS — E03.8 HYPOTHYROIDISM DUE TO HASHIMOTO'S THYROIDITIS: ICD-10-CM

## 2024-03-08 DIAGNOSIS — E11.65 TYPE 2 DIABETES MELLITUS WITH HYPERGLYCEMIA, WITHOUT LONG-TERM CURRENT USE OF INSULIN (HCC): ICD-10-CM

## 2024-03-08 DIAGNOSIS — I10 PRIMARY HYPERTENSION: ICD-10-CM

## 2024-03-08 DIAGNOSIS — Z79.4 TYPE 2 DIABETES MELLITUS WITH OTHER SPECIFIED COMPLICATION, WITH LONG-TERM CURRENT USE OF INSULIN (HCC): ICD-10-CM

## 2024-03-08 DIAGNOSIS — E78.2 MIXED HYPERLIPIDEMIA: ICD-10-CM

## 2024-03-08 DIAGNOSIS — E11.69 TYPE 2 DIABETES MELLITUS WITH OTHER SPECIFIED COMPLICATION, WITH LONG-TERM CURRENT USE OF INSULIN (HCC): ICD-10-CM

## 2024-03-08 DIAGNOSIS — E55.9 VITAMIN D DEFICIENCY: ICD-10-CM

## 2024-03-08 LAB
25(OH)D3 SERPL-MCNC: 39.5 NG/ML (ref 30–100)
ALBUMIN SERPL-MCNC: 3.8 G/DL (ref 3.5–5)
ALBUMIN/GLOB SERPL: 0.9 (ref 0.4–1.6)
ALP SERPL-CCNC: 66 U/L (ref 50–136)
ALT SERPL-CCNC: 28 U/L (ref 12–65)
ANION GAP SERPL CALC-SCNC: 2 MMOL/L (ref 2–11)
AST SERPL-CCNC: 11 U/L (ref 15–37)
BASOPHILS # BLD: 0 K/UL (ref 0–0.2)
BASOPHILS NFR BLD: 1 % (ref 0–2)
BILIRUB SERPL-MCNC: 0.2 MG/DL (ref 0.2–1.1)
BUN SERPL-MCNC: 15 MG/DL (ref 6–23)
CALCIUM SERPL-MCNC: 9.6 MG/DL (ref 8.3–10.4)
CHLORIDE SERPL-SCNC: 107 MMOL/L (ref 103–113)
CHOLEST SERPL-MCNC: 170 MG/DL
CO2 SERPL-SCNC: 33 MMOL/L (ref 21–32)
CREAT SERPL-MCNC: 1 MG/DL (ref 0.6–1)
DIFFERENTIAL METHOD BLD: ABNORMAL
EOSINOPHIL # BLD: 0.1 K/UL (ref 0–0.8)
EOSINOPHIL NFR BLD: 1 % (ref 0.5–7.8)
ERYTHROCYTE [DISTWIDTH] IN BLOOD BY AUTOMATED COUNT: 14.4 % (ref 11.9–14.6)
EST. AVERAGE GLUCOSE BLD GHB EST-MCNC: 183 MG/DL
GLOBULIN SER CALC-MCNC: 4.4 G/DL (ref 2.8–4.5)
GLUCOSE SERPL-MCNC: 165 MG/DL (ref 65–100)
HBA1C MFR BLD: 8 % (ref 4.8–5.6)
HCT VFR BLD AUTO: 34.3 % (ref 35.8–46.3)
HDLC SERPL-MCNC: 77 MG/DL (ref 40–60)
HDLC SERPL: 2.2
HGB BLD-MCNC: 10.7 G/DL (ref 11.7–15.4)
IMM GRANULOCYTES # BLD AUTO: 0 K/UL (ref 0–0.5)
IMM GRANULOCYTES NFR BLD AUTO: 0 % (ref 0–5)
LDLC SERPL CALC-MCNC: 86.8 MG/DL
LYMPHOCYTES # BLD: 1.7 K/UL (ref 0.5–4.6)
LYMPHOCYTES NFR BLD: 41 % (ref 13–44)
MCH RBC QN AUTO: 23.5 PG (ref 26.1–32.9)
MCHC RBC AUTO-ENTMCNC: 31.2 G/DL (ref 31.4–35)
MCV RBC AUTO: 75.2 FL (ref 82–102)
MONOCYTES # BLD: 0.4 K/UL (ref 0.1–1.3)
MONOCYTES NFR BLD: 10 % (ref 4–12)
NEUTS SEG # BLD: 2 K/UL (ref 1.7–8.2)
NEUTS SEG NFR BLD: 47 % (ref 43–78)
NRBC # BLD: 0 K/UL (ref 0–0.2)
PLATELET # BLD AUTO: 271 K/UL (ref 150–450)
PMV BLD AUTO: 11.6 FL (ref 9.4–12.3)
POTASSIUM SERPL-SCNC: 3.9 MMOL/L (ref 3.5–5.1)
PROT SERPL-MCNC: 8.2 G/DL (ref 6.3–8.2)
RBC # BLD AUTO: 4.56 M/UL (ref 4.05–5.2)
SODIUM SERPL-SCNC: 142 MMOL/L (ref 136–146)
TRIGL SERPL-MCNC: 31 MG/DL (ref 35–150)
TSH, 3RD GENERATION: 1.01 UIU/ML (ref 0.36–3.74)
VLDLC SERPL CALC-MCNC: 6.2 MG/DL (ref 6–23)
WBC # BLD AUTO: 4.2 K/UL (ref 4.3–11.1)

## 2024-04-14 ENCOUNTER — HOSPITAL ENCOUNTER (EMERGENCY)
Age: 59
Discharge: HOME | End: 2024-04-14
Payer: COMMERCIAL

## 2024-04-14 ENCOUNTER — APPOINTMENT (OUTPATIENT)
Dept: GENERAL RADIOLOGY | Age: 59
End: 2024-04-14

## 2024-04-14 VITALS
SYSTOLIC BLOOD PRESSURE: 119 MMHG | BODY MASS INDEX: 30.7 KG/M2 | WEIGHT: 195.6 LBS | RESPIRATION RATE: 18 BRPM | HEART RATE: 86 BPM | HEIGHT: 67 IN | DIASTOLIC BLOOD PRESSURE: 75 MMHG | TEMPERATURE: 98.4 F | OXYGEN SATURATION: 97 %

## 2024-04-14 DIAGNOSIS — J20.9 ACUTE BRONCHITIS, UNSPECIFIED ORGANISM: Primary | ICD-10-CM

## 2024-04-14 LAB
ANION GAP SERPL CALC-SCNC: 1 MMOL/L (ref 2–11)
BASOPHILS # BLD: 0 K/UL (ref 0–0.2)
BASOPHILS NFR BLD: 0 % (ref 0–2)
BUN SERPL-MCNC: 15 MG/DL (ref 6–23)
CALCIUM SERPL-MCNC: 9.7 MG/DL (ref 8.3–10.4)
CHLORIDE SERPL-SCNC: 104 MMOL/L (ref 103–113)
CO2 SERPL-SCNC: 31 MMOL/L (ref 21–32)
CREAT SERPL-MCNC: 1 MG/DL (ref 0.6–1)
DIFFERENTIAL METHOD BLD: ABNORMAL
EKG ATRIAL RATE: 83 BPM
EKG DIAGNOSIS: NORMAL
EKG P AXIS: 64 DEGREES
EKG P-R INTERVAL: 142 MS
EKG Q-T INTERVAL: 378 MS
EKG QRS DURATION: 90 MS
EKG QTC CALCULATION (BAZETT): 444 MS
EKG R AXIS: 49 DEGREES
EKG T AXIS: 45 DEGREES
EKG VENTRICULAR RATE: 83 BPM
EOSINOPHIL # BLD: 0.1 K/UL (ref 0–0.8)
EOSINOPHIL NFR BLD: 3 % (ref 0.5–7.8)
ERYTHROCYTE [DISTWIDTH] IN BLOOD BY AUTOMATED COUNT: 14.3 % (ref 11.9–14.6)
GLUCOSE SERPL-MCNC: 166 MG/DL (ref 65–100)
HCT VFR BLD AUTO: 35.8 % (ref 35.8–46.3)
HGB BLD-MCNC: 11.2 G/DL (ref 11.7–15.4)
IMM GRANULOCYTES # BLD AUTO: 0 K/UL (ref 0–0.5)
IMM GRANULOCYTES NFR BLD AUTO: 0 % (ref 0–5)
LYMPHOCYTES # BLD: 1.6 K/UL (ref 0.5–4.6)
LYMPHOCYTES NFR BLD: 36 % (ref 13–44)
MCH RBC QN AUTO: 23.4 PG (ref 26.1–32.9)
MCHC RBC AUTO-ENTMCNC: 31.3 G/DL (ref 31.4–35)
MCV RBC AUTO: 74.7 FL (ref 82–102)
MONOCYTES # BLD: 0.5 K/UL (ref 0.1–1.3)
MONOCYTES NFR BLD: 10 % (ref 4–12)
NEUTS SEG # BLD: 2.3 K/UL (ref 1.7–8.2)
NEUTS SEG NFR BLD: 51 % (ref 43–78)
NRBC # BLD: 0 K/UL (ref 0–0.2)
PLATELET # BLD AUTO: 269 K/UL (ref 150–450)
PMV BLD AUTO: 10.7 FL (ref 9.4–12.3)
POTASSIUM SERPL-SCNC: 4 MMOL/L (ref 3.5–5.1)
RBC # BLD AUTO: 4.79 M/UL (ref 4.05–5.2)
SODIUM SERPL-SCNC: 136 MMOL/L (ref 136–146)
TROPONIN I SERPL HS-MCNC: 3.9 PG/ML (ref 0–14)
WBC # BLD AUTO: 4.5 K/UL (ref 4.3–11.1)

## 2024-04-14 PROCEDURE — 80048 BASIC METABOLIC PNL TOTAL CA: CPT

## 2024-04-14 PROCEDURE — 93010 ELECTROCARDIOGRAM REPORT: CPT | Performed by: INTERNAL MEDICINE

## 2024-04-14 PROCEDURE — 93005 ELECTROCARDIOGRAM TRACING: CPT | Performed by: PHYSICIAN ASSISTANT

## 2024-04-14 PROCEDURE — 99285 EMERGENCY DEPT VISIT HI MDM: CPT

## 2024-04-14 PROCEDURE — 2580000003 HC RX 258: Performed by: PHYSICIAN ASSISTANT

## 2024-04-14 PROCEDURE — 84484 ASSAY OF TROPONIN QUANT: CPT

## 2024-04-14 PROCEDURE — 71046 X-RAY EXAM CHEST 2 VIEWS: CPT

## 2024-04-14 PROCEDURE — 85025 COMPLETE CBC W/AUTO DIFF WBC: CPT

## 2024-04-14 PROCEDURE — 6370000000 HC RX 637 (ALT 250 FOR IP): Performed by: PHYSICIAN ASSISTANT

## 2024-04-14 PROCEDURE — 94640 AIRWAY INHALATION TREATMENT: CPT

## 2024-04-14 RX ORDER — 0.9 % SODIUM CHLORIDE 0.9 %
1000 INTRAVENOUS SOLUTION INTRAVENOUS ONCE
Status: COMPLETED | OUTPATIENT
Start: 2024-04-14 | End: 2024-04-14

## 2024-04-14 RX ORDER — PREDNISONE 50 MG/1
50 TABLET ORAL DAILY
Qty: 3 TABLET | Refills: 0 | Status: SHIPPED | OUTPATIENT
Start: 2024-04-14 | End: 2024-04-17

## 2024-04-14 RX ORDER — BENZONATATE 100 MG/1
100 CAPSULE ORAL ONCE
Status: COMPLETED | OUTPATIENT
Start: 2024-04-14 | End: 2024-04-14

## 2024-04-14 RX ORDER — BENZONATATE 100 MG/1
100 CAPSULE ORAL 3 TIMES DAILY PRN
Qty: 30 CAPSULE | Refills: 0 | Status: SHIPPED | OUTPATIENT
Start: 2024-04-14 | End: 2024-04-24

## 2024-04-14 RX ORDER — DOXYCYCLINE HYCLATE 100 MG
100 TABLET ORAL 2 TIMES DAILY
Qty: 10 TABLET | Refills: 0 | Status: SHIPPED | OUTPATIENT
Start: 2024-04-14 | End: 2024-04-19

## 2024-04-14 RX ORDER — IPRATROPIUM BROMIDE AND ALBUTEROL SULFATE 2.5; .5 MG/3ML; MG/3ML
1 SOLUTION RESPIRATORY (INHALATION) ONCE
Status: COMPLETED | OUTPATIENT
Start: 2024-04-14 | End: 2024-04-14

## 2024-04-14 RX ADMIN — SODIUM CHLORIDE 1000 ML: 9 INJECTION, SOLUTION INTRAVENOUS at 09:58

## 2024-04-14 RX ADMIN — IPRATROPIUM BROMIDE AND ALBUTEROL SULFATE 1 DOSE: .5; 3 SOLUTION RESPIRATORY (INHALATION) at 10:02

## 2024-04-14 RX ADMIN — BENZONATATE 100 MG: 100 CAPSULE ORAL at 11:15

## 2024-04-14 ASSESSMENT — PAIN - FUNCTIONAL ASSESSMENT: PAIN_FUNCTIONAL_ASSESSMENT: NONE - DENIES PAIN

## 2024-04-14 NOTE — ED NOTES
Patient mobility status  with no difficulty. Provider aware     I have reviewed discharge instructions with the patient.  The patient verbalized understanding.    Patient left ED via Discharge Method: ambulatory to home with  self .    Opportunity for questions and clarification provided.     Patient given 3 scripts.            Elsie Macedo RN  04/14/24 [id number]

## 2024-04-14 NOTE — ED PROVIDER NOTES
Emergency Department Provider Note       PCP: Aleah Recio, APRN - NP   Age: 58 y.o.   Sex: female     DISPOSITION Decision To Discharge 04/14/2024 10:58:02 AM       ICD-10-CM    1. Acute bronchitis, unspecified organism  J20.9           Medical Decision Making     58-year-old female presents with cough for over a week.  Productive.  No fevers or systemic symptoms.  Taking allergy medications without improvement.  Chest only hurts when she coughs.  Cardiac workup here negative.  Chest x-ray clear.  She had negative home COVID test and did not want to retest.  Will treat for bronchitis.  Will cover with antibiotics due to duration of symptoms for possible subradiographic infiltrate.  She is to follow-up closely with her doctor and return if worsening in any way.  Stable vitals.  ED Course as of 04/14/24 1713   Sun Apr 14, 2024   0948 EKG 12 Lead  EKG shows sinus rhythm at rate of 83.  Nonspecific ST changes.  No STEMI. [URIEL]   0950 Hemoglobin Quant(!): 11.2  stable [URIEL]      ED Course User Index  [URIEL] Ann Huitron PA     1 or more acute illnesses that pose a threat to life or bodily function.   Prescription drug management performed.  Patient was discharged risks and benefits of hospitalization were considered.  Shared medical decision making was utilized in creating the patients health plan today.    I independently ordered and reviewed each unique test.                     History     58-year-old female with history of diabetes and seasonal allergies thyroid disease presents with productive cough for about a week and a half.  She has been using her allergy medication without much improvement.  She says initially she thought it was seasonal allergies, but it seems to be worsening.  Cough is productive with whitish sputum.  No fever or systemic symptoms.  She is up all night due to coughing.  Says it is worse at night.  Denies smoking.  Does have some chest soreness but only when she coughs.  Had

## 2024-04-14 NOTE — ED TRIAGE NOTES
Pt arrives ambulatory to triage with c/o productive cough of white phlegm for the past week unrelieved with use of Flonase and zyrtec. Reports cough is worse at night and at times makes her feel short of breath. Endorses chest pain with coughing and taking deep breath, denies pain with rest. At home COVID test negative yesterday.

## 2024-04-14 NOTE — PROGRESS NOTES
04/14/24 1002   Treatment   Treatment Type Aerosol generator;[facility]   $Treatment Type $Inhaled Therapy/Meds   Medications Albuterol/Ipratropium   Pre-Tx Pulse 97   Pre-Tx Resps 16   Breath Sounds Pre-Tx JITENDRA Diminished;Expiratory wheezes   Breath Sounds Pre-Tx LLL Diminished;Expiratory wheezes   Breath Sounds Pre-Tx RUL Diminished;Expiratory wheezes   Breath Sounds Pre-Tx RML Diminished;Expiratory wheezes   Breath Sounds Pre-Tx RLL Diminished;Expiratory wheezes   Breath Sounds Post-Tx JITENDRA Diminished;Expiratory wheezes   Breath Sounds Post-Tx LLL Diminished;Expiratory wheezes   Breath Sounds Post-Tx RUL Diminished;Expiratory wheezes   Breath Sounds Post-Tx RML Diminished;Expiratory wheezes   Breath Sounds Post-Tx RLL Diminished;Expiratory wheezes   Post-Tx Pulse 79   Post-Tx Resps 16   Delivery Source Breath actuated nebulizer;Mouthpiece;Oxygen   Position Sitting   Treatment Tolerance Well   Duration 10   Is patient on O2? N   Breath Sounds   Breath Sounds Bilateral Diminished   Oxygen Therapy/Pulse Ox   O2 Therapy Room air   O2 Device None (Room air)   Pulse 80   Respirations 15   SpO2 99 %

## 2024-04-19 ENCOUNTER — HOSPITAL ENCOUNTER (EMERGENCY)
Age: 59
Discharge: HOME | End: 2024-04-19
Payer: COMMERCIAL

## 2024-04-19 VITALS
OXYGEN SATURATION: 94 % | WEIGHT: 195 LBS | HEART RATE: 93 BPM | SYSTOLIC BLOOD PRESSURE: 125 MMHG | RESPIRATION RATE: 17 BRPM | BODY MASS INDEX: 30.61 KG/M2 | HEIGHT: 67 IN | DIASTOLIC BLOOD PRESSURE: 87 MMHG | TEMPERATURE: 98.4 F

## 2024-04-19 DIAGNOSIS — R10.84 GENERALIZED ABDOMINAL PAIN: Primary | ICD-10-CM

## 2024-04-19 DIAGNOSIS — R11.2 NAUSEA AND VOMITING, UNSPECIFIED VOMITING TYPE: ICD-10-CM

## 2024-04-19 LAB
ALBUMIN SERPL-MCNC: 3.1 G/DL (ref 3.5–5)
ALBUMIN/GLOB SERPL: 0.6 (ref 0.4–1.6)
ALP SERPL-CCNC: 60 U/L (ref 50–136)
ALT SERPL-CCNC: 29 U/L (ref 12–65)
ANION GAP SERPL CALC-SCNC: 5 MMOL/L (ref 2–11)
AST SERPL-CCNC: 26 U/L (ref 15–37)
BASE EXCESS BLDV CALC-SCNC: 2.6 MMOL/L
BASOPHILS # BLD: 0 K/UL (ref 0–0.2)
BASOPHILS NFR BLD: 0 % (ref 0–2)
BILIRUB SERPL-MCNC: 0.6 MG/DL (ref 0.2–1.1)
BILIRUB UR QL: NEGATIVE
BUN SERPL-MCNC: 21 MG/DL (ref 6–23)
CALCIUM SERPL-MCNC: 9.3 MG/DL (ref 8.3–10.4)
CHLORIDE SERPL-SCNC: 103 MMOL/L (ref 103–113)
CHP ED QC CHECK: 219
CO2 SERPL-SCNC: 26 MMOL/L (ref 21–32)
CREAT SERPL-MCNC: 1.1 MG/DL (ref 0.6–1)
DIFFERENTIAL METHOD BLD: ABNORMAL
EOSINOPHIL # BLD: 0.2 K/UL (ref 0–0.8)
EOSINOPHIL NFR BLD: 2 % (ref 0.5–7.8)
ERYTHROCYTE [DISTWIDTH] IN BLOOD BY AUTOMATED COUNT: 14.4 % (ref 11.9–14.6)
GAS FLOW.O2 O2 DELIVERY SYS: ABNORMAL
GLOBULIN SER CALC-MCNC: 4.8 G/DL (ref 2.8–4.5)
GLUCOSE BLD STRIP.AUTO-MCNC: 219 MG/DL (ref 65–100)
GLUCOSE SERPL-MCNC: 237 MG/DL (ref 65–100)
GLUCOSE UR QL STRIP.AUTO: 100 MG/DL
HCO3 BLDV-SCNC: 28.5 MMOL/L (ref 23–28)
HCT VFR BLD AUTO: 41 % (ref 35.8–46.3)
HGB BLD-MCNC: 13.4 G/DL (ref 11.7–15.4)
IMM GRANULOCYTES # BLD AUTO: 0.1 K/UL (ref 0–0.5)
IMM GRANULOCYTES NFR BLD AUTO: 1 % (ref 0–5)
KETONES UR-MCNC: NEGATIVE MG/DL
LEUKOCYTE ESTERASE UR QL STRIP: NEGATIVE
LIPASE SERPL-CCNC: 22 U/L (ref 73–393)
LYMPHOCYTES # BLD: 2.3 K/UL (ref 0.5–4.6)
LYMPHOCYTES NFR BLD: 29 % (ref 13–44)
MCH RBC QN AUTO: 24 PG (ref 26.1–32.9)
MCHC RBC AUTO-ENTMCNC: 32.7 G/DL (ref 31.4–35)
MCV RBC AUTO: 73.5 FL (ref 82–102)
MONOCYTES # BLD: 0.8 K/UL (ref 0.1–1.3)
MONOCYTES NFR BLD: 10 % (ref 4–12)
NEUTS SEG # BLD: 4.6 K/UL (ref 1.7–8.2)
NEUTS SEG NFR BLD: 58 % (ref 43–78)
NITRITE UR QL: NEGATIVE
NRBC # BLD: 0 K/UL (ref 0–0.2)
PCO2 BLDV: 48 MMHG (ref 41–51)
PH BLDV: 7.38 (ref 7.32–7.42)
PH UR: 6 (ref 5–9)
PLATELET # BLD AUTO: 326 K/UL (ref 150–450)
PMV BLD AUTO: 10.5 FL (ref 9.4–12.3)
PO2 BLDV: 32 MMHG
POTASSIUM SERPL-SCNC: 4.7 MMOL/L (ref 3.5–5.1)
PROT SERPL-MCNC: 7.9 G/DL (ref 6.3–8.2)
PROT UR QL: NEGATIVE MG/DL
RBC # BLD AUTO: 5.58 M/UL (ref 4.05–5.2)
RBC # UR STRIP: NEGATIVE
SAO2 % BLDV: 59.1 % (ref 65–88)
SERVICE CMNT-IMP: ABNORMAL
SODIUM SERPL-SCNC: 134 MMOL/L (ref 136–146)
SP GR UR: 1.01 (ref 1–1.02)
SPECIMEN TYPE: ABNORMAL
UROBILINOGEN UR QL: 0.2 EU/DL (ref 0.2–1)
WBC # BLD AUTO: 8 K/UL (ref 4.3–11.1)

## 2024-04-19 PROCEDURE — 96374 THER/PROPH/DIAG INJ IV PUSH: CPT

## 2024-04-19 PROCEDURE — 81003 URINALYSIS AUTO W/O SCOPE: CPT

## 2024-04-19 PROCEDURE — 82962 GLUCOSE BLOOD TEST: CPT

## 2024-04-19 PROCEDURE — 99284 EMERGENCY DEPT VISIT MOD MDM: CPT

## 2024-04-19 PROCEDURE — 83690 ASSAY OF LIPASE: CPT

## 2024-04-19 PROCEDURE — 6360000002 HC RX W HCPCS

## 2024-04-19 PROCEDURE — 2580000003 HC RX 258

## 2024-04-19 PROCEDURE — 80053 COMPREHEN METABOLIC PANEL: CPT

## 2024-04-19 PROCEDURE — 96361 HYDRATE IV INFUSION ADD-ON: CPT

## 2024-04-19 PROCEDURE — 85025 COMPLETE CBC W/AUTO DIFF WBC: CPT

## 2024-04-19 PROCEDURE — 82803 BLOOD GASES ANY COMBINATION: CPT

## 2024-04-19 PROCEDURE — 96375 TX/PRO/DX INJ NEW DRUG ADDON: CPT

## 2024-04-19 RX ORDER — MORPHINE SULFATE 4 MG/ML
4 INJECTION, SOLUTION INTRAMUSCULAR; INTRAVENOUS
Status: COMPLETED | OUTPATIENT
Start: 2024-04-19 | End: 2024-04-19

## 2024-04-19 RX ORDER — ONDANSETRON 4 MG/1
4 TABLET, ORALLY DISINTEGRATING ORAL 3 TIMES DAILY PRN
Qty: 20 TABLET | Refills: 0 | Status: SHIPPED | OUTPATIENT
Start: 2024-04-19

## 2024-04-19 RX ORDER — METOCLOPRAMIDE HYDROCHLORIDE 5 MG/ML
10 INJECTION INTRAMUSCULAR; INTRAVENOUS
Status: COMPLETED | OUTPATIENT
Start: 2024-04-19 | End: 2024-04-19

## 2024-04-19 RX ORDER — 0.9 % SODIUM CHLORIDE 0.9 %
1000 INTRAVENOUS SOLUTION INTRAVENOUS ONCE
Status: COMPLETED | OUTPATIENT
Start: 2024-04-19 | End: 2024-04-19

## 2024-04-19 RX ORDER — HYDROCODONE BITARTRATE AND ACETAMINOPHEN 5; 325 MG/1; MG/1
1 TABLET ORAL EVERY 6 HOURS PRN
Qty: 10 TABLET | Refills: 0 | Status: SHIPPED | OUTPATIENT
Start: 2024-04-19 | End: 2024-04-22

## 2024-04-19 RX ADMIN — SODIUM CHLORIDE 1000 ML: 9 INJECTION, SOLUTION INTRAVENOUS at 18:45

## 2024-04-19 RX ADMIN — METOCLOPRAMIDE HYDROCHLORIDE 10 MG: 5 INJECTION INTRAMUSCULAR; INTRAVENOUS at 18:39

## 2024-04-19 RX ADMIN — MORPHINE SULFATE 4 MG: 4 INJECTION INTRAVENOUS at 18:40

## 2024-04-19 ASSESSMENT — PAIN DESCRIPTION - DESCRIPTORS: DESCRIPTORS: CRAMPING

## 2024-04-19 ASSESSMENT — PAIN DESCRIPTION - LOCATION: LOCATION: ABDOMEN

## 2024-04-19 ASSESSMENT — PAIN SCALES - GENERAL: PAINLEVEL_OUTOF10: 6

## 2024-04-19 NOTE — ED PROVIDER NOTES
Emergency Department Provider Note       PCP: Aleah Recio, ELIZABETH - JACKIE   Age: 58 y.o.   Sex: female     DISPOSITION Decision To Discharge 04/19/2024 07:14:23 PM       ICD-10-CM    1. Generalized abdominal pain  R10.84 HYDROcodone-acetaminophen (NORCO) 5-325 MG per tablet      2. Nausea and vomiting, unspecified vomiting type  R11.2           Medical Decision Making     58-year-old diabetic female presents with 24-hour history of periumbilical/epigastric abdominal pain, nausea and vomiting.  Has noticed her BGL to be fairly elevated in the upper 200s after being prescribed a course of steroids for upper respiratory issue last week.    Arrives to the ED mildly tachycardic but otherwise with stable vital signs in no apparent distress.  Exam demonstrates mild tenderness to to the epigastrium and periumbilical abdomen without guarding.  Differential includes but not limited to viral enteritis, GERD/gastritis, PUD, pancreatitis, DKA, metabolic derangement, UTI.  Will obtain labs and UA to evaluate for the above.    Labs did not demonstrate any significant abnormality.  Her BGL is 237 with a closed anion gap.  Venous blood gas within normal limits.  UA negative.  Patient with mild improvement upon reevaluation.  Offered to proceed with CT imaging despite low suspicion for surgical abdomen which the patient declined at this time.  Will encourage close follow-up with PCP to ensure resolution of symptoms.  Patient encouraged to return with any new or worsening. I have prescribed a brief course of analgesics and antiemetics. She is currently prescribed PPI + H2 antagonist for which she reports compliance.       1 acute illness with systemic symptoms.  Prescription drug management performed.  Parental controlled substances given in the ED.  Patient was discharged risks and benefits of hospitalization were considered.  Shared medical decision making was utilized in creating the patients health plan today.    I  independently ordered and reviewed each unique test.           History     Patient is a 58-year-old female with history of diabetes type 2, hypothyroidism, GERD, hypertension presenting to the emergency department for evaluation of abdominal pain, nausea and vomiting.  Patient states that her symptoms began yesterday afternoon.  States that she was at work when she suddenly felt flushed and overheated followed by a sudden burst of nausea and several episodes of emesis.  States that she was able to cool herself down some cold rags and felt better initially but states that she awoke with persistent periumbilical/epigastric abdominal pain with nausea. Does note some generalized weakness during this timeline.  Denies any significant change in bowel habits or urinary symptoms.  No known fevers.  Checked her blood glucose noted to be elevated in the 270s earlier this morning but was recently prescribed steroids due to presumed bronchitis at her recent ED visit earlier this week.  She has no further complaints.    The history is provided by the patient.       Physical Exam     Vitals signs and nursing note reviewed:  Vitals:    04/19/24 1840 04/19/24 1900 04/19/24 1905 04/19/24 1930   BP:  126/89  125/87   Pulse:  90  93   Resp: 16 18  17   Temp:    98.4 °F (36.9 °C)   TempSrc:    Oral   SpO2:  96%  94%   Weight:   88.5 kg (195 lb)    Height:   1.702 m (5' 7\")       Physical Exam  Vitals and nursing note reviewed.   Constitutional:       General: She is not in acute distress.     Appearance: Normal appearance. She is not ill-appearing.   HENT:      Head: Normocephalic and atraumatic.      Right Ear: External ear normal.      Left Ear: External ear normal.      Nose: Nose normal.   Eyes:      General: No scleral icterus.        Right eye: No discharge.         Left eye: No discharge.      Extraocular Movements: Extraocular movements intact.      Conjunctiva/sclera: Conjunctivae normal.      Pupils: Pupils are equal, round,

## 2024-04-19 NOTE — ED NOTES
Patient mobility status  with no difficulty. Provider aware     I have reviewed discharge instructions with the patient.  The patient verbalized understanding.    Patient left ED via Discharge Method: ambulatory to Home with Child.    Opportunity for questions and clarification provided.     Patient given 2 scripts.            Magui Cruz RN  04/19/24 1936

## 2024-04-19 NOTE — DISCHARGE INSTRUCTIONS
Labs are overall reassuring.  I have sent a prescription for pain and nausea medication to your pharmacy to use on an as-needed basis.  Please note the hydrocodone may make you feel drowsy and impair your ability to drive.  Plan to follow-up with your primary doctor on Monday if not completely better.    Please return with any new or worsening symptoms in the interim.

## 2024-04-19 NOTE — ED TRIAGE NOTES
Patient reports of abdominal pain and weakness. Patient had an episode of feeling very hot. Nausea and vomit.  2 vomit episodes. Thick frothy vomit. Patient isn't able to keep the food down. Patient reports of feeling like \" she is full\".

## 2024-08-19 ENCOUNTER — TELEPHONE (OUTPATIENT)
Dept: PULMONOLOGY | Age: 59
End: 2024-08-19

## 2024-09-09 ENCOUNTER — TELEPHONE (OUTPATIENT)
Dept: PULMONOLOGY | Age: 59
End: 2024-09-09

## 2025-05-13 SDOH — HEALTH STABILITY: PHYSICAL HEALTH: ON AVERAGE, HOW MANY DAYS PER WEEK DO YOU ENGAGE IN MODERATE TO STRENUOUS EXERCISE (LIKE A BRISK WALK)?: 4 DAYS

## 2025-05-13 SDOH — HEALTH STABILITY: PHYSICAL HEALTH: ON AVERAGE, HOW MANY MINUTES DO YOU ENGAGE IN EXERCISE AT THIS LEVEL?: 20 MIN

## 2025-05-16 ENCOUNTER — OFFICE VISIT (OUTPATIENT)
Dept: FAMILY MEDICINE CLINIC | Facility: CLINIC | Age: 60
End: 2025-05-16
Payer: COMMERCIAL

## 2025-05-16 VITALS — BODY MASS INDEX: 31.39 KG/M2 | WEIGHT: 200 LBS | TEMPERATURE: 98.2 F | HEIGHT: 67 IN

## 2025-05-16 DIAGNOSIS — E11.65 TYPE 2 DIABETES MELLITUS WITH HYPERGLYCEMIA, WITHOUT LONG-TERM CURRENT USE OF INSULIN (HCC): Primary | ICD-10-CM

## 2025-05-16 DIAGNOSIS — F33.1 MODERATE EPISODE OF RECURRENT MAJOR DEPRESSIVE DISORDER (HCC): ICD-10-CM

## 2025-05-16 DIAGNOSIS — E78.2 MIXED HYPERLIPIDEMIA: ICD-10-CM

## 2025-05-16 DIAGNOSIS — E55.9 VITAMIN D DEFICIENCY: ICD-10-CM

## 2025-05-16 DIAGNOSIS — I10 PRIMARY HYPERTENSION: ICD-10-CM

## 2025-05-16 DIAGNOSIS — K21.9 GASTROESOPHAGEAL REFLUX DISEASE WITHOUT ESOPHAGITIS: ICD-10-CM

## 2025-05-16 DIAGNOSIS — Z12.31 SCREENING MAMMOGRAM FOR BREAST CANCER: ICD-10-CM

## 2025-05-16 DIAGNOSIS — D50.8 IRON DEFICIENCY ANEMIA SECONDARY TO INADEQUATE DIETARY IRON INTAKE: ICD-10-CM

## 2025-05-16 DIAGNOSIS — E06.3 HYPOTHYROIDISM DUE TO HASHIMOTO'S THYROIDITIS: ICD-10-CM

## 2025-05-16 PROCEDURE — 99214 OFFICE O/P EST MOD 30 MIN: CPT | Performed by: PHYSICIAN ASSISTANT

## 2025-05-16 RX ORDER — METFORMIN HYDROCHLORIDE 500 MG/1
500 TABLET, EXTENDED RELEASE ORAL 2 TIMES DAILY
Qty: 120 TABLET | Refills: 5 | Status: SHIPPED | OUTPATIENT
Start: 2025-05-16

## 2025-05-16 RX ORDER — ATORVASTATIN CALCIUM 10 MG/1
10 TABLET, FILM COATED ORAL DAILY
Qty: 90 TABLET | Refills: 3 | Status: CANCELLED | OUTPATIENT
Start: 2025-05-16

## 2025-05-16 RX ORDER — BUPROPION HYDROCHLORIDE 150 MG/1
150 TABLET ORAL EVERY MORNING
Qty: 30 TABLET | Refills: 0 | Status: SHIPPED | OUTPATIENT
Start: 2025-05-16

## 2025-05-16 RX ORDER — AMLODIPINE BESYLATE 10 MG/1
10 TABLET ORAL DAILY
Qty: 60 TABLET | Refills: 5 | Status: CANCELLED | OUTPATIENT
Start: 2025-05-16

## 2025-05-16 ASSESSMENT — ENCOUNTER SYMPTOMS
VOMITING: 0
ABDOMINAL PAIN: 0
NAUSEA: 0
SHORTNESS OF BREATH: 0

## 2025-05-16 ASSESSMENT — PATIENT HEALTH QUESTIONNAIRE - PHQ9
9. THOUGHTS THAT YOU WOULD BE BETTER OFF DEAD, OR OF HURTING YOURSELF: NOT AT ALL
7. TROUBLE CONCENTRATING ON THINGS, SUCH AS READING THE NEWSPAPER OR WATCHING TELEVISION: NOT AT ALL
2. FEELING DOWN, DEPRESSED OR HOPELESS: MORE THAN HALF THE DAYS
4. FEELING TIRED OR HAVING LITTLE ENERGY: MORE THAN HALF THE DAYS
10. IF YOU CHECKED OFF ANY PROBLEMS, HOW DIFFICULT HAVE THESE PROBLEMS MADE IT FOR YOU TO DO YOUR WORK, TAKE CARE OF THINGS AT HOME, OR GET ALONG WITH OTHER PEOPLE: VERY DIFFICULT
3. TROUBLE FALLING OR STAYING ASLEEP: NEARLY EVERY DAY
SUM OF ALL RESPONSES TO PHQ QUESTIONS 1-9: 11
6. FEELING BAD ABOUT YOURSELF - OR THAT YOU ARE A FAILURE OR HAVE LET YOURSELF OR YOUR FAMILY DOWN: NOT AT ALL
SUM OF ALL RESPONSES TO PHQ QUESTIONS 1-9: 11
1. LITTLE INTEREST OR PLEASURE IN DOING THINGS: MORE THAN HALF THE DAYS
SUM OF ALL RESPONSES TO PHQ QUESTIONS 1-9: 11
SUM OF ALL RESPONSES TO PHQ QUESTIONS 1-9: 11
8. MOVING OR SPEAKING SO SLOWLY THAT OTHER PEOPLE COULD HAVE NOTICED. OR THE OPPOSITE, BEING SO FIGETY OR RESTLESS THAT YOU HAVE BEEN MOVING AROUND A LOT MORE THAN USUAL: SEVERAL DAYS
5. POOR APPETITE OR OVEREATING: SEVERAL DAYS

## 2025-05-16 NOTE — PROGRESS NOTES
Establish Care  Name: Jaimee Tate Today’s Date: 2025   MRN: [id number] Sex: Female   Age: 59 y.o. Ethnicity: Non- / Non    : 1965 Race: Black /       Jaimee Tate is here to establish care.       Assessment & Plan   Type 2 diabetes mellitus with hyperglycemia, without long-term current use of insulin (HCC)  Can decrease to 500 mg BID due to GI side effects, will likely need additional medication but will wait on A1c as it has been over a year since it has been checked.   -     metFORMIN (GLUCOPHAGE-XR) 500 MG extended release tablet; Take 1 tablet by mouth in the morning and at bedtime, Disp-120 tablet, R-5Normal  -     Comprehensive Metabolic Panel; Future  -     CBC with Auto Differential; Future  -     AMB POC HEMOGLOBIN A1C  Primary hypertension  Well controlled today, no need for medication at this time  -     Comprehensive Metabolic Panel; Future  -     CBC with Auto Differential; Future  Mixed hyperlipidemia  Check fasting labs  -     Comprehensive Metabolic Panel; Future  -     CBC with Auto Differential; Future  -     Lipid Panel; Future  Hypothyroidism due to Hashimoto's thyroiditis  Check labs and restart medication  -     Comprehensive Metabolic Panel; Future  -     CBC with Auto Differential; Future  -     TSH; Future  -     T4, Free; Future  Gastroesophageal reflux disease without esophagitis  Diet controlled, monitor  -     Comprehensive Metabolic Panel; Future  -     CBC with Auto Differential; Future  Iron deficiency anemia secondary to inadequate dietary iron intake  -     Comprehensive Metabolic Panel; Future  -     CBC with Auto Differential; Future  Vitamin D deficiency  -     Vitamin D 25 Hydroxy; Future  Moderate episode of recurrent major depressive disorder (HCC)  -     [facility] [address] (Psychiatry)  -     buPROPion (WELLBUTRIN XL) 150 MG extended release tablet; Take 1 tablet by mouth every morning,

## 2025-05-23 ENCOUNTER — LAB (OUTPATIENT)
Dept: FAMILY MEDICINE CLINIC | Facility: CLINIC | Age: 60
End: 2025-05-23

## 2025-05-23 ENCOUNTER — RESULTS FOLLOW-UP (OUTPATIENT)
Dept: FAMILY MEDICINE CLINIC | Facility: CLINIC | Age: 60
End: 2025-05-23

## 2025-05-23 DIAGNOSIS — E55.9 VITAMIN D DEFICIENCY: ICD-10-CM

## 2025-05-23 DIAGNOSIS — E06.3 HYPOTHYROIDISM DUE TO HASHIMOTO THYROIDITIS: ICD-10-CM

## 2025-05-23 DIAGNOSIS — E11.65 TYPE 2 DIABETES MELLITUS WITH HYPERGLYCEMIA, WITHOUT LONG-TERM CURRENT USE OF INSULIN (HCC): ICD-10-CM

## 2025-05-23 DIAGNOSIS — E06.3 HYPOTHYROIDISM DUE TO HASHIMOTO'S THYROIDITIS: ICD-10-CM

## 2025-05-23 DIAGNOSIS — I10 PRIMARY HYPERTENSION: ICD-10-CM

## 2025-05-23 DIAGNOSIS — E11.65 UNCONTROLLED TYPE 2 DIABETES MELLITUS WITH HYPERGLYCEMIA (HCC): Primary | ICD-10-CM

## 2025-05-23 DIAGNOSIS — K21.9 GASTROESOPHAGEAL REFLUX DISEASE WITHOUT ESOPHAGITIS: ICD-10-CM

## 2025-05-23 DIAGNOSIS — D50.8 IRON DEFICIENCY ANEMIA SECONDARY TO INADEQUATE DIETARY IRON INTAKE: ICD-10-CM

## 2025-05-23 DIAGNOSIS — E78.2 MIXED HYPERLIPIDEMIA: ICD-10-CM

## 2025-05-23 LAB
25(OH)D3 SERPL-MCNC: 27.2 NG/ML (ref 30–100)
ALBUMIN SERPL-MCNC: 3.8 G/DL (ref 3.5–5)
ALBUMIN/GLOB SERPL: 0.9 (ref 1–1.9)
ALP SERPL-CCNC: 88 U/L (ref 35–104)
ALT SERPL-CCNC: 37 U/L (ref 8–45)
ANION GAP SERPL CALC-SCNC: 11 MMOL/L (ref 7–16)
AST SERPL-CCNC: 19 U/L (ref 15–37)
BASOPHILS # BLD: 0.02 K/UL (ref 0–0.2)
BASOPHILS NFR BLD: 0.5 % (ref 0–2)
BILIRUB SERPL-MCNC: 0.3 MG/DL (ref 0–1.2)
BUN SERPL-MCNC: 15 MG/DL (ref 6–23)
CALCIUM SERPL-MCNC: 10.1 MG/DL (ref 8.8–10.2)
CHLORIDE SERPL-SCNC: 99 MMOL/L (ref 98–107)
CHOLEST SERPL-MCNC: 184 MG/DL (ref 0–200)
CO2 SERPL-SCNC: 26 MMOL/L (ref 20–29)
CREAT SERPL-MCNC: 0.93 MG/DL (ref 0.6–1.1)
DIFFERENTIAL METHOD BLD: ABNORMAL
EOSINOPHIL # BLD: 0.13 K/UL (ref 0–0.8)
EOSINOPHIL NFR BLD: 3.2 % (ref 0.5–7.8)
ERYTHROCYTE [DISTWIDTH] IN BLOOD BY AUTOMATED COUNT: 13.8 % (ref 11.9–14.6)
GLOBULIN SER CALC-MCNC: 4.4 G/DL (ref 2.3–3.5)
GLUCOSE SERPL-MCNC: 390 MG/DL (ref 70–99)
HCT VFR BLD AUTO: 38.6 % (ref 35.8–46.3)
HDLC SERPL-MCNC: 73 MG/DL (ref 40–60)
HDLC SERPL: 2.5 (ref 0–5)
HGB BLD-MCNC: 13 G/DL (ref 11.7–15.4)
IMM GRANULOCYTES # BLD AUTO: 0.01 K/UL (ref 0–0.5)
IMM GRANULOCYTES NFR BLD AUTO: 0.2 % (ref 0–5)
LDLC SERPL CALC-MCNC: 97 MG/DL (ref 0–100)
LYMPHOCYTES # BLD: 1.35 K/UL (ref 0.5–4.6)
LYMPHOCYTES NFR BLD: 33.2 % (ref 13–44)
MCH RBC QN AUTO: 24.1 PG (ref 26.1–32.9)
MCHC RBC AUTO-ENTMCNC: 33.7 G/DL (ref 31.4–35)
MCV RBC AUTO: 71.5 FL (ref 82–102)
MONOCYTES # BLD: 0.33 K/UL (ref 0.1–1.3)
MONOCYTES NFR BLD: 8.1 % (ref 4–12)
NEUTS SEG # BLD: 2.23 K/UL (ref 1.7–8.2)
NEUTS SEG NFR BLD: 54.8 % (ref 43–78)
NRBC # BLD: 0 K/UL (ref 0–0.2)
PLATELET # BLD AUTO: 259 K/UL (ref 150–450)
PMV BLD AUTO: 11.6 FL (ref 9.4–12.3)
POTASSIUM SERPL-SCNC: 4.5 MMOL/L (ref 3.5–5.1)
PROT SERPL-MCNC: 8.2 G/DL (ref 6.3–8.2)
RBC # BLD AUTO: 5.4 M/UL (ref 4.05–5.2)
SODIUM SERPL-SCNC: 137 MMOL/L (ref 136–145)
T4 FREE SERPL-MCNC: 1 NG/DL (ref 0.9–1.7)
TRIGL SERPL-MCNC: 69 MG/DL (ref 0–150)
TSH, 3RD GENERATION: 7.07 UIU/ML (ref 0.27–4.2)
VLDLC SERPL CALC-MCNC: 14 MG/DL (ref 6–23)
WBC # BLD AUTO: 4.1 K/UL (ref 4.3–11.1)

## 2025-05-23 RX ORDER — LEVOTHYROXINE SODIUM 88 UG/1
88 TABLET ORAL DAILY
Qty: 90 TABLET | Refills: 1 | Status: SHIPPED | OUTPATIENT
Start: 2025-05-23

## 2025-05-27 DIAGNOSIS — Z79.4 TYPE 2 DIABETES MELLITUS WITH OTHER SPECIFIED COMPLICATION, WITH LONG-TERM CURRENT USE OF INSULIN (HCC): Primary | ICD-10-CM

## 2025-05-27 DIAGNOSIS — E11.69 TYPE 2 DIABETES MELLITUS WITH OTHER SPECIFIED COMPLICATION, WITH LONG-TERM CURRENT USE OF INSULIN (HCC): Primary | ICD-10-CM

## 2025-05-27 RX ORDER — GLIPIZIDE 5 MG/1
5 TABLET ORAL
Qty: 30 TABLET | Refills: 2 | Status: SHIPPED | OUTPATIENT
Start: 2025-05-27 | End: 2025-08-25

## 2025-06-02 DIAGNOSIS — M79.7 MUSCLE PAIN, FIBROMYALGIA: ICD-10-CM

## 2025-06-02 RX ORDER — PREGABALIN 25 MG/1
25 CAPSULE ORAL 3 TIMES DAILY
Qty: 90 CAPSULE | Refills: 2 | Status: SHIPPED | OUTPATIENT
Start: 2025-06-02 | End: 2025-08-31

## 2025-06-06 ENCOUNTER — HOSPITAL ENCOUNTER (OUTPATIENT)
Dept: MAMMOGRAPHY | Age: 60
Discharge: HOME | End: 2025-06-09
Payer: COMMERCIAL

## 2025-06-06 DIAGNOSIS — Z12.31 SCREENING MAMMOGRAM FOR BREAST CANCER: ICD-10-CM

## 2025-06-06 PROCEDURE — 77067 SCR MAMMO BI INCL CAD: CPT

## 2025-06-06 PROCEDURE — 77063 BREAST TOMOSYNTHESIS BI: CPT

## 2025-06-13 ENCOUNTER — RESULTS FOLLOW-UP (OUTPATIENT)
Dept: FAMILY MEDICINE CLINIC | Facility: CLINIC | Age: 60
End: 2025-06-13

## 2025-07-17 ENCOUNTER — COMMUNITY OUTREACH (OUTPATIENT)
Dept: FAMILY MEDICINE CLINIC | Facility: CLINIC | Age: 60
End: 2025-07-17

## 2025-07-25 ENCOUNTER — OFFICE VISIT (OUTPATIENT)
Dept: BEHAVIORAL/MENTAL HEALTH CLINIC | Age: 60
End: 2025-07-25
Payer: COMMERCIAL

## 2025-07-25 VITALS
BODY MASS INDEX: 32.49 KG/M2 | HEART RATE: 73 BPM | TEMPERATURE: 98.4 F | DIASTOLIC BLOOD PRESSURE: 88 MMHG | HEIGHT: 67 IN | SYSTOLIC BLOOD PRESSURE: 132 MMHG | OXYGEN SATURATION: 99 % | WEIGHT: 207 LBS

## 2025-07-25 DIAGNOSIS — F33.1 MODERATE EPISODE OF RECURRENT MAJOR DEPRESSIVE DISORDER (HCC): Primary | ICD-10-CM

## 2025-07-25 DIAGNOSIS — F41.1 GENERALIZED ANXIETY DISORDER: ICD-10-CM

## 2025-07-25 DIAGNOSIS — F51.05 INSOMNIA DUE TO MENTAL DISORDER: ICD-10-CM

## 2025-07-25 PROCEDURE — 90792 PSYCH DIAG EVAL W/MED SRVCS: CPT | Performed by: PSYCHIATRY & NEUROLOGY

## 2025-07-25 RX ORDER — BUPROPION HYDROCHLORIDE 150 MG/1
150 TABLET ORAL EVERY MORNING
Qty: 30 TABLET | Refills: 3 | Status: SHIPPED | OUTPATIENT
Start: 2025-07-25

## 2025-07-25 RX ORDER — TRAZODONE HYDROCHLORIDE 50 MG/1
25-50 TABLET ORAL NIGHTLY
Qty: 30 TABLET | Refills: 3 | Status: SHIPPED | OUTPATIENT
Start: 2025-07-25

## 2025-07-25 ASSESSMENT — PATIENT HEALTH QUESTIONNAIRE - PHQ9
6. FEELING BAD ABOUT YOURSELF - OR THAT YOU ARE A FAILURE OR HAVE LET YOURSELF OR YOUR FAMILY DOWN: SEVERAL DAYS
SUM OF ALL RESPONSES TO PHQ QUESTIONS 1-9: 16
4. FEELING TIRED OR HAVING LITTLE ENERGY: NEARLY EVERY DAY
9. THOUGHTS THAT YOU WOULD BE BETTER OFF DEAD, OR OF HURTING YOURSELF: NOT AT ALL
9. THOUGHTS THAT YOU WOULD BE BETTER OFF DEAD, OR OF HURTING YOURSELF: NOT AT ALL
4. FEELING TIRED OR HAVING LITTLE ENERGY: NEARLY EVERY DAY
6. FEELING BAD ABOUT YOURSELF - OR THAT YOU ARE A FAILURE OR HAVE LET YOURSELF OR YOUR FAMILY DOWN: MORE THAN HALF THE DAYS
SUM OF ALL RESPONSES TO PHQ QUESTIONS 1-9: 19
8. MOVING OR SPEAKING SO SLOWLY THAT OTHER PEOPLE COULD HAVE NOTICED. OR THE OPPOSITE, BEING SO FIGETY OR RESTLESS THAT YOU HAVE BEEN MOVING AROUND A LOT MORE THAN USUAL: SEVERAL DAYS
10. IF YOU CHECKED OFF ANY PROBLEMS, HOW DIFFICULT HAVE THESE PROBLEMS MADE IT FOR YOU TO DO YOUR WORK, TAKE CARE OF THINGS AT HOME, OR GET ALONG WITH OTHER PEOPLE: SOMEWHAT DIFFICULT
3. TROUBLE FALLING OR STAYING ASLEEP: NEARLY EVERY DAY
2. FEELING DOWN, DEPRESSED OR HOPELESS: MORE THAN HALF THE DAYS
2. FEELING DOWN, DEPRESSED OR HOPELESS: NEARLY EVERY DAY
3. TROUBLE FALLING OR STAYING ASLEEP: NEARLY EVERY DAY
SUM OF ALL RESPONSES TO PHQ QUESTIONS 1-9: 19
7. TROUBLE CONCENTRATING ON THINGS, SUCH AS READING THE NEWSPAPER OR WATCHING TELEVISION: MORE THAN HALF THE DAYS
1. LITTLE INTEREST OR PLEASURE IN DOING THINGS: MORE THAN HALF THE DAYS
5. POOR APPETITE OR OVEREATING: NEARLY EVERY DAY
8. MOVING OR SPEAKING SO SLOWLY THAT OTHER PEOPLE COULD HAVE NOTICED. OR THE OPPOSITE - BEING SO FIDGETY OR RESTLESS THAT YOU HAVE BEEN MOVING AROUND A LOT MORE THAN USUAL: SEVERAL DAYS
5. POOR APPETITE OR OVEREATING: MORE THAN HALF THE DAYS
10. IF YOU CHECKED OFF ANY PROBLEMS, HOW DIFFICULT HAVE THESE PROBLEMS MADE IT FOR YOU TO DO YOUR WORK, TAKE CARE OF THINGS AT HOME, OR GET ALONG WITH OTHER PEOPLE: EXTREMELY DIFFICULT
SUM OF ALL RESPONSES TO PHQ QUESTIONS 1-9: 19
7. TROUBLE CONCENTRATING ON THINGS, SUCH AS READING THE NEWSPAPER OR WATCHING TELEVISION: SEVERAL DAYS
SUM OF ALL RESPONSES TO PHQ QUESTIONS 1-9: 19
1. LITTLE INTEREST OR PLEASURE IN DOING THINGS: NEARLY EVERY DAY

## 2025-07-25 ASSESSMENT — ANXIETY QUESTIONNAIRES
4. TROUBLE RELAXING: MORE THAN HALF THE DAYS
2. NOT BEING ABLE TO STOP OR CONTROL WORRYING: NEARLY EVERY DAY
6. BECOMING EASILY ANNOYED OR IRRITABLE: MORE THAN HALF THE DAYS
5. BEING SO RESTLESS THAT IT IS HARD TO SIT STILL: SEVERAL DAYS
6. BECOMING EASILY ANNOYED OR IRRITABLE: MORE THAN HALF THE DAYS
4. TROUBLE RELAXING: MORE THAN HALF THE DAYS
1. FEELING NERVOUS, ANXIOUS, OR ON EDGE: NEARLY EVERY DAY
2. NOT BEING ABLE TO STOP OR CONTROL WORRYING: NEARLY EVERY DAY
3. WORRYING TOO MUCH ABOUT DIFFERENT THINGS: NEARLY EVERY DAY
7. FEELING AFRAID AS IF SOMETHING AWFUL MIGHT HAPPEN: MORE THAN HALF THE DAYS
IF YOU CHECKED OFF ANY PROBLEMS ON THIS QUESTIONNAIRE, HOW DIFFICULT HAVE THESE PROBLEMS MADE IT FOR YOU TO DO YOUR WORK, TAKE CARE OF THINGS AT HOME, OR GET ALONG WITH OTHER PEOPLE: VERY DIFFICULT
7. FEELING AFRAID AS IF SOMETHING AWFUL MIGHT HAPPEN: MORE THAN HALF THE DAYS
3. WORRYING TOO MUCH ABOUT DIFFERENT THINGS: NEARLY EVERY DAY

## 2025-07-25 NOTE — PROGRESS NOTES
.        Patient: Jaimee Tate Age:  59 y.o.    : 1965     SEX: female MRN:   [id number]           RACE: Black /     SEEN:  [x]  PATIENT  []  SPOUSE []  OTHER:                2025     7:21 AM 2025     2:47 PM 2024    10:41 AM   PHQ-9    Depression Unable to Assess   Pt refuses   Little interest or pleasure in doing things 3 2    Feeling down, depressed, or hopeless 2 2    Trouble falling or staying asleep, or sleeping too much 3 3    Feeling tired or having little energy 3 2    Poor appetite or overeating 3 1    Feeling bad about yourself - or that you are a failure or have let yourself or your family down 2 0    Trouble concentrating on things, such as reading the newspaper or watching television 2 0    Moving or speaking so slowly that other people could have noticed. Or the opposite - being so fidgety or restless that you have been moving around a lot more than usual 1 1    Thoughts that you would be better off dead, or of hurting yourself in some way 0 0    PHQ-2 Score 5 4    PHQ-9 Total Score 19  11    If you checked off any problems, how difficult have these problems made it for you to do your work, take care of things at home, or get along with other people? 3 2        Patient-reported           2025     7:19 AM   DENA-7 SCREENING   Feeling nervous, anxious, or on edge More than half the days   Not being able to stop or control worrying Nearly every day   Worrying too much about different things Nearly every day   Trouble relaxing More than half the days   Being so restless that it is hard to sit still Not at all   Becoming easily annoyed or irritable More than half the days   Feeling afraid as if something awful might happen More than half the days   DENA-7 Total Score 14   How difficult have these problems made it for you to do your work, take care of things at home, or get along with other people? Somewhat difficult          Chief complaint: Patient is a

## 2025-08-18 RX ORDER — TRAZODONE HYDROCHLORIDE 50 MG/1
TABLET ORAL
Qty: 90 TABLET | Refills: 2 | OUTPATIENT
Start: 2025-08-18

## 2025-08-18 RX ORDER — BUPROPION HYDROCHLORIDE 150 MG/1
150 TABLET ORAL EVERY MORNING
Qty: 90 TABLET | Refills: 2 | OUTPATIENT
Start: 2025-08-18

## 2025-08-22 ENCOUNTER — OFFICE VISIT (OUTPATIENT)
Dept: FAMILY MEDICINE CLINIC | Facility: CLINIC | Age: 60
End: 2025-08-22
Payer: COMMERCIAL

## 2025-08-22 VITALS
DIASTOLIC BLOOD PRESSURE: 82 MMHG | BODY MASS INDEX: 32.17 KG/M2 | TEMPERATURE: 98 F | WEIGHT: 205.4 LBS | HEART RATE: 83 BPM | OXYGEN SATURATION: 99 % | SYSTOLIC BLOOD PRESSURE: 138 MMHG

## 2025-08-22 DIAGNOSIS — Z79.4 TYPE 2 DIABETES MELLITUS WITH OTHER SPECIFIED COMPLICATION, WITH LONG-TERM CURRENT USE OF INSULIN (HCC): ICD-10-CM

## 2025-08-22 DIAGNOSIS — F33.1 MODERATE EPISODE OF RECURRENT MAJOR DEPRESSIVE DISORDER (HCC): Primary | ICD-10-CM

## 2025-08-22 DIAGNOSIS — E11.69 TYPE 2 DIABETES MELLITUS WITH OTHER SPECIFIED COMPLICATION, WITH LONG-TERM CURRENT USE OF INSULIN (HCC): ICD-10-CM

## 2025-08-22 DIAGNOSIS — E78.5 HYPERLIPIDEMIA, UNSPECIFIED HYPERLIPIDEMIA TYPE: ICD-10-CM

## 2025-08-22 DIAGNOSIS — E06.3 HYPOTHYROIDISM DUE TO HASHIMOTO THYROIDITIS: ICD-10-CM

## 2025-08-22 DIAGNOSIS — M79.7 MUSCLE PAIN, FIBROMYALGIA: ICD-10-CM

## 2025-08-22 LAB
ALBUMIN SERPL-MCNC: 3.8 G/DL (ref 3.5–5)
ALBUMIN/GLOB SERPL: 0.9 (ref 1–1.9)
ALP SERPL-CCNC: 68 U/L (ref 35–104)
ALT SERPL-CCNC: 32 U/L (ref 8–45)
ANION GAP SERPL CALC-SCNC: 11 MMOL/L (ref 7–16)
AST SERPL-CCNC: 16 U/L (ref 15–37)
BASOPHILS # BLD: 0.02 K/UL (ref 0–0.2)
BASOPHILS NFR BLD: 0.5 % (ref 0–2)
BILIRUB SERPL-MCNC: 0.3 MG/DL (ref 0–1.2)
BUN SERPL-MCNC: 12 MG/DL (ref 6–23)
CALCIUM SERPL-MCNC: 9.8 MG/DL (ref 8.8–10.2)
CHLORIDE SERPL-SCNC: 102 MMOL/L (ref 98–107)
CHOLEST SERPL-MCNC: 183 MG/DL (ref 0–200)
CO2 SERPL-SCNC: 25 MMOL/L (ref 20–29)
CREAT SERPL-MCNC: 0.86 MG/DL (ref 0.6–1.1)
DIFFERENTIAL METHOD BLD: ABNORMAL
EOSINOPHIL # BLD: 0.09 K/UL (ref 0–0.8)
EOSINOPHIL NFR BLD: 2.1 % (ref 0.5–7.8)
ERYTHROCYTE [DISTWIDTH] IN BLOOD BY AUTOMATED COUNT: 14 % (ref 11.9–14.6)
EST. AVERAGE GLUCOSE BLD GHB EST-MCNC: 269 MG/DL
GLOBULIN SER CALC-MCNC: 4.4 G/DL (ref 2.3–3.5)
GLUCOSE SERPL-MCNC: 287 MG/DL (ref 70–99)
HBA1C MFR BLD: 11 % (ref 0–5.6)
HCT VFR BLD AUTO: 37.1 % (ref 35.8–46.3)
HDLC SERPL-MCNC: 69 MG/DL (ref 40–60)
HDLC SERPL: 2.7 (ref 0–5)
HGB BLD-MCNC: 11.7 G/DL (ref 11.7–15.4)
IMM GRANULOCYTES # BLD AUTO: 0.01 K/UL (ref 0–0.5)
IMM GRANULOCYTES NFR BLD AUTO: 0.2 % (ref 0–5)
LDLC SERPL CALC-MCNC: 104 MG/DL (ref 0–100)
LYMPHOCYTES # BLD: 1.48 K/UL (ref 0.5–4.6)
LYMPHOCYTES NFR BLD: 34.8 % (ref 13–44)
MCH RBC QN AUTO: 24.1 PG (ref 26.1–32.9)
MCHC RBC AUTO-ENTMCNC: 31.5 G/DL (ref 31.4–35)
MCV RBC AUTO: 76.5 FL (ref 82–102)
MONOCYTES # BLD: 0.39 K/UL (ref 0.1–1.3)
MONOCYTES NFR BLD: 9.2 % (ref 4–12)
NEUTS SEG # BLD: 2.26 K/UL (ref 1.7–8.2)
NEUTS SEG NFR BLD: 53.2 % (ref 43–78)
NRBC # BLD: 0 K/UL (ref 0–0.2)
PLATELET # BLD AUTO: 292 K/UL (ref 150–450)
PMV BLD AUTO: 10.9 FL (ref 9.4–12.3)
POTASSIUM SERPL-SCNC: 4.2 MMOL/L (ref 3.5–5.1)
PROT SERPL-MCNC: 8.2 G/DL (ref 6.3–8.2)
RBC # BLD AUTO: 4.85 M/UL (ref 4.05–5.2)
SODIUM SERPL-SCNC: 138 MMOL/L (ref 136–145)
TRIGL SERPL-MCNC: 52 MG/DL (ref 0–150)
TSH W FREE THYROID IF ABNORMAL: 1.64 UIU/ML (ref 0.27–4.2)
VLDLC SERPL CALC-MCNC: 10 MG/DL (ref 6–23)
WBC # BLD AUTO: 4.3 K/UL (ref 4.3–11.1)

## 2025-08-22 PROCEDURE — 3079F DIAST BP 80-89 MM HG: CPT | Performed by: PHYSICIAN ASSISTANT

## 2025-08-22 PROCEDURE — 99214 OFFICE O/P EST MOD 30 MIN: CPT | Performed by: PHYSICIAN ASSISTANT

## 2025-08-22 PROCEDURE — 3075F SYST BP GE 130 - 139MM HG: CPT | Performed by: PHYSICIAN ASSISTANT

## 2025-08-22 RX ORDER — PREGABALIN 25 MG/1
25 CAPSULE ORAL 3 TIMES DAILY
Qty: 90 CAPSULE | Refills: 0 | Status: SHIPPED | OUTPATIENT
Start: 2025-08-22 | End: 2025-11-20

## 2025-08-22 RX ORDER — BUPROPION HYDROCHLORIDE 300 MG/1
300 TABLET ORAL EVERY MORNING
Qty: 90 TABLET | Refills: 0 | Status: SHIPPED | OUTPATIENT
Start: 2025-08-22

## 2025-08-22 RX ORDER — LEVOTHYROXINE SODIUM 88 UG/1
88 TABLET ORAL DAILY
Qty: 90 TABLET | Refills: 1 | Status: SHIPPED | OUTPATIENT
Start: 2025-08-22

## 2025-08-22 SDOH — ECONOMIC STABILITY: FOOD INSECURITY: WITHIN THE PAST 12 MONTHS, YOU WORRIED THAT YOUR FOOD WOULD RUN OUT BEFORE YOU GOT MONEY TO BUY MORE.: OFTEN TRUE

## 2025-08-22 SDOH — ECONOMIC STABILITY: FOOD INSECURITY: WITHIN THE PAST 12 MONTHS, THE FOOD YOU BOUGHT JUST DIDN'T LAST AND YOU DIDN'T HAVE MONEY TO GET MORE.: OFTEN TRUE

## 2025-08-22 SDOH — ECONOMIC STABILITY: FOOD INSECURITY: WITHIN THE PAST 12 MONTHS, THE FOOD YOU BOUGHT JUST DIDN'T LAST AND YOU DIDN'T HAVE MONEY TO GET MORE.: NEVER TRUE

## 2025-08-22 SDOH — ECONOMIC STABILITY: FOOD INSECURITY: WITHIN THE PAST 12 MONTHS, YOU WORRIED THAT YOUR FOOD WOULD RUN OUT BEFORE YOU GOT MONEY TO BUY MORE.: NEVER TRUE

## 2025-08-22 ASSESSMENT — ENCOUNTER SYMPTOMS
ABDOMINAL PAIN: 0
SHORTNESS OF BREATH: 0